# Patient Record
Sex: FEMALE | Race: BLACK OR AFRICAN AMERICAN | NOT HISPANIC OR LATINO | Employment: OTHER | URBAN - METROPOLITAN AREA
[De-identification: names, ages, dates, MRNs, and addresses within clinical notes are randomized per-mention and may not be internally consistent; named-entity substitution may affect disease eponyms.]

---

## 2022-12-09 ENCOUNTER — ANCILLARY PROCEDURE (OUTPATIENT)
Dept: ULTRASOUND IMAGING | Facility: CLINIC | Age: 32
End: 2022-12-09
Payer: MEDICAID

## 2022-12-09 DIAGNOSIS — Z34.92 NORMAL PREGNANCY, SECOND TRIMESTER: ICD-10-CM

## 2022-12-09 DIAGNOSIS — Z34.92 NORMAL PREGNANCY, SECOND TRIMESTER: Primary | ICD-10-CM

## 2022-12-09 PROCEDURE — 76819 FETAL BIOPHYS PROFIL W/O NST: CPT | Mod: 26 | Performed by: OBSTETRICS & GYNECOLOGY

## 2022-12-09 PROCEDURE — 76816 OB US FOLLOW-UP PER FETUS: CPT | Mod: 26 | Performed by: OBSTETRICS & GYNECOLOGY

## 2022-12-09 PROCEDURE — 76816 OB US FOLLOW-UP PER FETUS: CPT

## 2022-12-11 ENCOUNTER — TELEPHONE (OUTPATIENT)
Dept: OBGYN | Facility: CLINIC | Age: 32
End: 2022-12-11

## 2022-12-11 DIAGNOSIS — O36.5990 FETAL GROWTH RESTRICTION ANTEPARTUM: Primary | ICD-10-CM

## 2022-12-11 NOTE — TELEPHONE ENCOUNTER
Called patient to review US results and let her know that M referral will be placed for further fetal ultrasound and fetal surveillance. Pt states that all of her children have been small, but she will schedule the follow-up as [facility] recommends.     Pt is scheduled for her [facility] visit at [facility] this week.     KERRY Martin CNM      Current Scan On (mm/dd/yyyy):  12/9/2022                       EDC:   02/07/2023        GA by Current Scan:      31w3d  The calculation of the gestational age by current scan was based on BPD, HC, AC and FL.     Anatomy Scan:  Buchanan gestation.  Biometry:  BPD 7.65 cm 30w4d 4.6%   HC 27.95 cm 30w4d <2.3%   AC 26.35 cm 30w3d 6.2%   FL 6.57 cm 33w6d 75.6%   EFW (lbs/oz) 3 lbs               15ozs       EFW (g) 1792 g 17.8%        Fetal heart rate: 141 bpm  Fetal presentation: Cephalic  Amniotic fluid: 4.48 cm MVP  Placenta: Anterior     Maternal Anatomy:  Right adnexa:  not well seen   Left adnexa:  not well seen      Biophysical Profile:  Fetal body movements: Normal (2)  Fetal tone: Normal (2)  Fetal breathing movements: Normal (2)  Amniotic fluid volume: Normal (2)   BPP Score: 8/8      Impression: 8/8 BPP, overall growth 17.8% but AC <10% - FGR.

## 2022-12-12 ENCOUNTER — TRANSCRIBE ORDERS (OUTPATIENT)
Dept: MATERNAL FETAL MEDICINE | Facility: CLINIC | Age: 32
End: 2022-12-12

## 2022-12-12 ENCOUNTER — PRE VISIT (OUTPATIENT)
Dept: MATERNAL FETAL MEDICINE | Facility: CLINIC | Age: 32
End: 2022-12-12

## 2022-12-12 DIAGNOSIS — O26.90 PREGNANCY RELATED CONDITION, ANTEPARTUM: Primary | ICD-10-CM

## 2022-12-14 ENCOUNTER — OFFICE VISIT (OUTPATIENT)
Dept: OBGYN | Facility: CLINIC | Age: 32
End: 2022-12-14
Attending: ADVANCED PRACTICE MIDWIFE
Payer: MEDICAID

## 2022-12-14 VITALS
DIASTOLIC BLOOD PRESSURE: 71 MMHG | WEIGHT: 149 LBS | SYSTOLIC BLOOD PRESSURE: 105 MMHG | BODY MASS INDEX: 26.4 KG/M2 | HEART RATE: 78 BPM | HEIGHT: 63 IN

## 2022-12-14 DIAGNOSIS — O09.299 HX OF MATERNAL LACERATION, 3RD DEGREE, CURRENTLY PREGNANT: ICD-10-CM

## 2022-12-14 DIAGNOSIS — O09.93 HIGH-RISK PREGNANCY, THIRD TRIMESTER: Primary | ICD-10-CM

## 2022-12-14 DIAGNOSIS — R82.71 GBS BACTERIURIA: ICD-10-CM

## 2022-12-14 DIAGNOSIS — R79.89 ABNORMAL THYROID BLOOD TEST: ICD-10-CM

## 2022-12-14 DIAGNOSIS — O36.5990 FETAL GROWTH RESTRICTION ANTEPARTUM: ICD-10-CM

## 2022-12-14 DIAGNOSIS — D58.2 HIGH BLOOD HEMOGLOBIN F (H): ICD-10-CM

## 2022-12-14 DIAGNOSIS — B00.9 HUMAN HERPES SIMPLEX VIRUS TYPE 1 (HSV-1) DNA DETECTED: ICD-10-CM

## 2022-12-14 PROCEDURE — G0463 HOSPITAL OUTPT CLINIC VISIT: HCPCS | Performed by: ADVANCED PRACTICE MIDWIFE

## 2022-12-14 PROCEDURE — 99207 PR PRENATAL VISIT: CPT | Performed by: ADVANCED PRACTICE MIDWIFE

## 2022-12-14 PROCEDURE — 250N000011 HC RX IP 250 OP 636

## 2022-12-14 PROCEDURE — 90715 TDAP VACCINE 7 YRS/> IM: CPT

## 2022-12-14 PROCEDURE — 90471 IMMUNIZATION ADMIN: CPT

## 2022-12-14 RX ORDER — PRENATAL VIT/IRON FUM/FOLIC AC 27MG-0.8MG
1 TABLET ORAL DAILY
COMMUNITY
End: 2023-01-10

## 2022-12-14 RX ORDER — FERROUS SULFATE 325(65) MG
325 TABLET ORAL
Qty: 90 TABLET | Refills: 3 | Status: SHIPPED | OUTPATIENT
Start: 2022-12-14 | End: 2023-01-10

## 2022-12-14 ASSESSMENT — ANXIETY QUESTIONNAIRES
7. FEELING AFRAID AS IF SOMETHING AWFUL MIGHT HAPPEN: NOT AT ALL
3. WORRYING TOO MUCH ABOUT DIFFERENT THINGS: NOT AT ALL
GAD7 TOTAL SCORE: 0
6. BECOMING EASILY ANNOYED OR IRRITABLE: NOT AT ALL
1. FEELING NERVOUS, ANXIOUS, OR ON EDGE: NOT AT ALL
GAD7 TOTAL SCORE: 0
IF YOU CHECKED OFF ANY PROBLEMS ON THIS QUESTIONNAIRE, HOW DIFFICULT HAVE THESE PROBLEMS MADE IT FOR YOU TO DO YOUR WORK, TAKE CARE OF THINGS AT HOME, OR GET ALONG WITH OTHER PEOPLE: NOT DIFFICULT AT ALL
2. NOT BEING ABLE TO STOP OR CONTROL WORRYING: NOT AT ALL
5. BEING SO RESTLESS THAT IT IS HARD TO SIT STILL: NOT AT ALL

## 2022-12-14 ASSESSMENT — PATIENT HEALTH QUESTIONNAIRE - PHQ9
SUM OF ALL RESPONSES TO PHQ QUESTIONS 1-9: 1
5. POOR APPETITE OR OVEREATING: NOT AT ALL

## 2022-12-14 NOTE — LETTER
2022       RE: Dariana Castellon  [address]  [place] [address]     Dear Colleague,    Thank you for referring your patient, Dariana Castellon, to the [facility] [place] at [facility]. Please see a copy of my visit note below.    Transfer of Care  SUBJECTIVE  32 year old woman presents to clinic for transfer of OB care appointment.  Patient's last menstrual period was 2022.  at 33w1d by Estimated Date of Delivery: 2023 based on US.     - Feels well overall.   - Pt was receiving prenatal care in [facility].  Initiated prenatal care at 8 weeks, has had 4 visit total.      - Reason for transfer to [facility] care, moved to [place].  - prenatal records available in Care Everywhere, reviewed   - After review of prenatal records, additional routine orders are recommended: none, will defer Hep B antibody until 36 week labs.  - Pre pregnancy BMI 23.   Pre Pregnancy Weight 139 lbs.  Height 63 in.     Reports good fetal movement. Denies contractions/crmaping. Denies LOF or vaginal bleeding.  OTHER CONCERNS:    - Pt reports LMP of 22 (overall certain LMP) with PETER 2023, had dating ultrasound at 13 weeks with PETER 2023.  Discussed < 7 day difference and our practice would use LMP dating.  Pt verbalized understanding, would like to wait until MFM appointment to change PETER.    - FGR, no history of FGR in records    - : BPP 8/8, MVP 4.48cm, cephalic. AC 6.2%, EFW 17.8%   - Hx of GHTN in records.  Pt denies hx of GHTN in past pregnancies.  Reports uncomplicated pregnancies- no GDM, GHTN, PPH. Hx of VAVD with 3rd deg with 1st baby, followed by .    - Had epidural with both, likely planning epidural this labor as well.  Planning partner and sister (Wilver) for support.     - Hgb ELP with elevated F    - Current Medications    Current Outpatient Medications   Medication Sig Dispense Refill     Ferrous  Gluconate 239 (27 Fe) MG TABS Take 1 capsule by mouth       ferrous sulfate (FEROSUL) 325 (65 Fe) MG tablet Take 1 tablet (325 mg) by mouth daily (with breakfast) 90 tablet 3     Prenatal Vit-Fe Fumarate-FA (PRENATAL MULTIVITAMIN W/IRON) 27-0.8 MG tablet Take 1 tablet by mouth daily           - Co-morbids: No past medical history on file.    PERSONAL/SOCIAL HISTORY  Partner is involved,  Alfredito.    Lives with their family.  Employment: Unemployed. Her job involves light activity .  History of anxiety or depression : denies  Additional items: Denies past or present domestic violence, sexual and psychological abuse.    PSYCHIATRIC:  Denies mood changes.  Has History of mood changes  PHQ-9 score:    PHQ-9 SCORE 12/14/2022   PHQ-9 Total Score 1     MARCO ANTONIO-7 SCORE 12/14/2022   Total Score 0     Past History:  Her past medical history No past medical history on file..   Her past pregnancies have been uncomplicated  Since her last LMP she denies use of alcohol, tobacco and street drugs.  HISTORY:  No family history on file.  Social History     Socioeconomic History     Marital status:      Spouse name: Alfredito     Number of children: None     Years of education: None     Highest education level: None   Tobacco Use     Smoking status: Never     Smokeless tobacco: Never   Vaping Use     Vaping Use: Never used   Substance and Sexual Activity     Alcohol use: Never     Drug use: Never     Current Outpatient Medications   Medication Sig     Ferrous Gluconate 239 (27 Fe) MG TABS Take 1 capsule by mouth     ferrous sulfate (FEROSUL) 325 (65 Fe) MG tablet Take 1 tablet (325 mg) by mouth daily (with breakfast)     Prenatal Vit-Fe Fumarate-FA (PRENATAL MULTIVITAMIN W/IRON) 27-0.8 MG tablet Take 1 tablet by mouth daily     No current facility-administered medications for this visit.     No Known Allergies    ============================================  MEDICAL HISTORY  No past medical history on file.  No past surgical history on  "file.    OB History    Para Term  AB Living   3 2 2 0 0 2   SAB IAB Ectopic Multiple Live Births   0 0 0 0 2      # Outcome Date GA Lbr Ang/2nd Weight Sex Delivery Anes PTL Lv   3 Current            2 Term 21 40w5d / 00:13 2.995 kg (6 lb 9.6 oz) M Vag-Spont EPI N JAN      Name: ZACH BENAVIDEZ      Apgar1: 9  Apgar5: 9   1 Term 18 39w1d  2.863 kg (6 lb 5 oz) F Vag-Vacuum EPI  JAN      Complications: Disorder involving thrombocytopenia (H)      Name: Celine      Obstetric Comments   Breastfeed- 1+ year     I personally reviewed the past social/family/medical and surgical history on the date of service.     ROS: 10 point ROS neg other than the symptoms noted above in the HPI.    Objective  /71 (BP Location: Left arm, Patient Position: Sitting, Cuff Size: Adult Regular)   Pulse 78   Ht 1.6 m (5' 3\")   Wt 67.6 kg (149 lb)   LMP 2022   BMI 26.39 kg/m     EXAM:  GENERAL:  Pleasant pregnant female, alert, cooperative and well groomed.  SKIN:  Warm and dry, without lesions or rashes  HEAD: Symmetrical features.  MOUTH:  Buccal mucosa pink, moist without lesions.  Teeth in fair repair.    NECK:  Thyroid without enlargement and nodules.  Lymph nodes not palpable.   LUNGS:  Clear to auscultation.  BREAST:  Deferred  HEART:  RRR without murmur.  ABDOMEN: Soft without masses , tenderness or organomegaly.  No CVA tenderness.  Uterus palpable at size equal to dates.  No scars noted.. Fetal heart tones present.  MUSCULOSKELETAL:  Full range of motion  EXTREMITIES:  No edema. No significant varicosities.   PELVIC EXAM: Deferred  WET PREP: Not done  GC/CHLAMYDIA CULTURE OBTAINED: NO     Assessment/Plan  32 year old , 33w1d weeks of pregnancy with PETER of 2023 by US    Orders Placed This Encounter   Procedures     TDAP VACCINE (Adacel, Boostrix)  [[id number]]     - Oriented to Practice, types of care, and how to reach a provider.  Pt prefers CNM team  - Educational handout on the " prevention of infections diseases during pregnancy provided.  - Reviewed healthy diet and foods to avoid, exercise and activity during pregnancy; avoiding exposure to toxoplasmosis; and maintenance of a generally healthy lifestyle.   - Discussed the harms, benefits, side effects and alternative therapies for current prescribed and OTC medications. Patient was encouraged to start prenatal vitamins as tolerated.    - Reviewed use of triage nurse line and contacting the on-call provider after hours for an urgent need such as fever, vagina bleeding, bladder or vaginal infection, rupture of membranes,  or term labor.    - Pregnancy concerns to be addressed by provider at next OB visit include: f/u FGR, consider completing trep, TSH, and Hep B antibody.  - All pt's questions discussed and answered.  Pt verbalized understanding of and agreement to plan of care.   - Continue scheduled prenatal care and prn if questions or concerns, RTC in 2 weeks    KERRY Suarez CNM        Again, thank you for allowing me to participate in the care of your patient.      Sincerely,    KERRY Suarez CNM

## 2022-12-14 NOTE — LETTER
Date:December 14, 2022      Patient was self referred, no letter generated. Do not send.        [facility] Health Information

## 2022-12-14 NOTE — PROGRESS NOTES
Transfer of Care  SUBJECTIVE  32 year old woman presents to clinic for transfer of OB care appointment.  Patient's last menstrual period was 2022.  at 33w1d by Estimated Date of Delivery: 2023 based on US.     - Feels well overall.   - Pt was receiving prenatal care in [facility].  Initiated prenatal care at 8 weeks, has had 4 visit total.      - Reason for transfer to [facility], moved to [place].  - prenatal records available in Care Everywhere, reviewed   - After review of prenatal records, additional routine orders are recommended: none, will defer Hep B antibody until 36 week labs.  - Pre pregnancy BMI 23.   Pre Pregnancy Weight 139 lbs.  Height 63 in.     Reports good fetal movement. Denies contractions/crmaping. Denies LOF or vaginal bleeding.  OTHER CONCERNS:    - Pt reports LMP of 22 (overall certain LMP) with PETER 2023, had dating ultrasound at 13 weeks with PETER 2023.  Discussed < 7 day difference and our practice would use LMP dating.  Pt verbalized understanding, would like to wait until MFM appointment to change PETER.    - FGR, no history of FGR in records    - : BPP 8/8, MVP 4.48cm, cephalic. AC 6.2%, EFW 17.8%   - Hx of GHTN in records.  Pt denies hx of GHTN in past pregnancies.  Reports uncomplicated pregnancies- no GDM, GHTN, PPH. Hx of VAVD with 3rd deg with 1st baby, followed by .    - Had epidural with both, likely planning epidural this labor as well.  Planning partner and sister (Wilver) for support.     - Hgb ELP with elevated F    - Current Medications    Current Outpatient Medications   Medication Sig Dispense Refill     Ferrous Gluconate 239 (27 Fe) MG TABS Take 1 capsule by mouth       ferrous sulfate (FEROSUL) 325 (65 Fe) MG tablet Take 1 tablet (325 mg) by mouth daily (with breakfast) 90 tablet 3     Prenatal Vit-Fe Fumarate-FA (PRENATAL MULTIVITAMIN W/IRON) 27-0.8 MG tablet Take 1 tablet by mouth daily           - Co-morbids: No past  medical history on file.    PERSONAL/SOCIAL HISTORY  Partner is involved,  Alfredito.    Lives with their family.  Employment: Unemployed. Her job involves light activity .  History of anxiety or depression : denies  Additional items: Denies past or present domestic violence, sexual and psychological abuse.    PSYCHIATRIC:  Denies mood changes.  Has History of mood changes  PHQ-9 score:    PHQ-9 SCORE 2022   PHQ-9 Total Score 1     MARCO ANTONIO-7 SCORE 2022   Total Score 0     Past History:  Her past medical history No past medical history on file..   Her past pregnancies have been uncomplicated  Since her last LMP she denies use of alcohol, tobacco and street drugs.  HISTORY:  No family history on file.  Social History     Socioeconomic History     Marital status:      Spouse name: Alfredito     Number of children: None     Years of education: None     Highest education level: None   Tobacco Use     Smoking status: Never     Smokeless tobacco: Never   Vaping Use     Vaping Use: Never used   Substance and Sexual Activity     Alcohol use: Never     Drug use: Never     Current Outpatient Medications   Medication Sig     Ferrous Gluconate 239 (27 Fe) MG TABS Take 1 capsule by mouth     ferrous sulfate (FEROSUL) 325 (65 Fe) MG tablet Take 1 tablet (325 mg) by mouth daily (with breakfast)     Prenatal Vit-Fe Fumarate-FA (PRENATAL MULTIVITAMIN W/IRON) 27-0.8 MG tablet Take 1 tablet by mouth daily     No current facility-administered medications for this visit.     No Known Allergies    ============================================  MEDICAL HISTORY  No past medical history on file.  No past surgical history on file.    OB History    Para Term  AB Living   3 2 2 0 0 2   SAB IAB Ectopic Multiple Live Births   0 0 0 0 2      # Outcome Date GA Lbr Ang/2nd Weight Sex Delivery Anes PTL Lv   3 Current            2 Term 21 40w5d / 00:13 2.995 kg (6 lb 9.6 oz) M Vag-Spont EPI N JAN      Name: ZACH BENAVIDEZ  "     Apgar1: 9  Apgar5: 9   1 Term 18 39w1d  2.863 kg (6 lb 5 oz) F Vag-Vacuum EPI  JAN      Complications: Disorder involving thrombocytopenia (H)      Name: Celine      Obstetric Comments   Breastfeed- 1+ year     I personally reviewed the past social/family/medical and surgical history on the date of service.     ROS: 10 point ROS neg other than the symptoms noted above in the HPI.    Objective  /71 (BP Location: Left arm, Patient Position: Sitting, Cuff Size: Adult Regular)   Pulse 78   Ht 1.6 m (5' 3\")   Wt 67.6 kg (149 lb)   LMP 2022   BMI 26.39 kg/m     EXAM:  GENERAL:  Pleasant pregnant female, alert, cooperative and well groomed.  SKIN:  Warm and dry, without lesions or rashes  HEAD: Symmetrical features.  MOUTH:  Buccal mucosa pink, moist without lesions.  Teeth in fair repair.    NECK:  Thyroid without enlargement and nodules.  Lymph nodes not palpable.   LUNGS:  Clear to auscultation.  BREAST:  Deferred  HEART:  RRR without murmur.  ABDOMEN: Soft without masses , tenderness or organomegaly.  No CVA tenderness.  Uterus palpable at size equal to dates.  No scars noted.. Fetal heart tones present.  MUSCULOSKELETAL:  Full range of motion  EXTREMITIES:  No edema. No significant varicosities.   PELVIC EXAM: Deferred  WET PREP: Not done  GC/CHLAMYDIA CULTURE OBTAINED: NO     Assessment/Plan  32 year old , 33w1d weeks of pregnancy with PETER of 2023 by US    Orders Placed This Encounter   Procedures     TDAP VACCINE (Adacel, Boostrix)  [[id number]]     - Oriented to Practice, types of care, and how to reach a provider.  Pt prefers CNM team  - Educational handout on the prevention of infections diseases during pregnancy provided.  - Reviewed healthy diet and foods to avoid, exercise and activity during pregnancy; avoiding exposure to toxoplasmosis; and maintenance of a generally healthy lifestyle.   - Discussed the harms, benefits, side effects and alternative therapies for current " prescribed and OTC medications. Patient was encouraged to start prenatal vitamins as tolerated.    - Reviewed use of triage nurse line and contacting the on-call provider after hours for an urgent need such as fever, vagina bleeding, bladder or vaginal infection, rupture of membranes,  or term labor.    - Pregnancy concerns to be addressed by provider at next OB visit include: f/u FGR, consider completing trep, TSH, and Hep B antibody.  - All pt's questions discussed and answered.  Pt verbalized understanding of and agreement to plan of care.   - Continue scheduled prenatal care and prn if questions or concerns, RTC in 2 weeks    KERRY Suarez CNM

## 2022-12-15 ENCOUNTER — OFFICE VISIT (OUTPATIENT)
Dept: MATERNAL FETAL MEDICINE | Facility: CLINIC | Age: 32
End: 2022-12-15
Attending: MIDWIFE
Payer: MEDICAID

## 2022-12-15 ENCOUNTER — HOSPITAL ENCOUNTER (OUTPATIENT)
Dept: ULTRASOUND IMAGING | Facility: CLINIC | Age: 32
Discharge: HOME | End: 2022-12-15
Attending: MIDWIFE
Payer: MEDICAID

## 2022-12-15 VITALS — SYSTOLIC BLOOD PRESSURE: 103 MMHG | DIASTOLIC BLOOD PRESSURE: 69 MMHG

## 2022-12-15 DIAGNOSIS — O36.5990 FETAL GROWTH RESTRICTION ANTEPARTUM: Primary | ICD-10-CM

## 2022-12-15 DIAGNOSIS — O26.90 PREGNANCY RELATED CONDITION, ANTEPARTUM: ICD-10-CM

## 2022-12-15 PROCEDURE — 76811 OB US DETAILED SNGL FETUS: CPT | Mod: 26 | Performed by: OBSTETRICS & GYNECOLOGY

## 2022-12-15 PROCEDURE — 76811 OB US DETAILED SNGL FETUS: CPT

## 2022-12-15 PROCEDURE — 76820 UMBILICAL ARTERY ECHO: CPT | Mod: 26 | Performed by: OBSTETRICS & GYNECOLOGY

## 2022-12-15 PROCEDURE — 59025 FETAL NON-STRESS TEST: CPT

## 2022-12-15 PROCEDURE — 59025 FETAL NON-STRESS TEST: CPT | Mod: 26 | Performed by: OBSTETRICS & GYNECOLOGY

## 2022-12-16 NOTE — PROGRESS NOTES
"Please see \"Imaging\" tab under Chart Review for full details.    Paulette France MD  Maternal Fetal Medicine    "

## 2022-12-28 ENCOUNTER — HOSPITAL ENCOUNTER (OUTPATIENT)
Dept: ULTRASOUND IMAGING | Facility: CLINIC | Age: 32
Discharge: HOME | End: 2022-12-28
Attending: OBSTETRICS & GYNECOLOGY
Payer: MEDICAID

## 2022-12-28 ENCOUNTER — OFFICE VISIT (OUTPATIENT)
Dept: MATERNAL FETAL MEDICINE | Facility: CLINIC | Age: 32
End: 2022-12-28
Attending: OBSTETRICS & GYNECOLOGY
Payer: MEDICAID

## 2022-12-28 DIAGNOSIS — O36.5990 FETAL GROWTH RESTRICTION ANTEPARTUM: ICD-10-CM

## 2022-12-28 DIAGNOSIS — O36.5990 FETAL GROWTH RESTRICTION ANTEPARTUM: Primary | ICD-10-CM

## 2022-12-28 PROCEDURE — 76820 UMBILICAL ARTERY ECHO: CPT | Mod: 26 | Performed by: OBSTETRICS & GYNECOLOGY

## 2022-12-28 PROCEDURE — 76820 UMBILICAL ARTERY ECHO: CPT

## 2022-12-28 PROCEDURE — 76815 OB US LIMITED FETUS(S): CPT | Mod: 26 | Performed by: OBSTETRICS & GYNECOLOGY

## 2022-12-28 PROCEDURE — 59025 FETAL NON-STRESS TEST: CPT

## 2022-12-28 PROCEDURE — 59025 FETAL NON-STRESS TEST: CPT | Mod: 26 | Performed by: OBSTETRICS & GYNECOLOGY

## 2022-12-28 NOTE — NURSING NOTE
NST Performed due to fetal growth restriction.  Dr. Greene reviewed efm tracing. See NST/BPP Doc Flowsheet tab.

## 2022-12-28 NOTE — PROGRESS NOTES
"Please see \"Imaging\" tab under \"Chart Review\" for details of today's US.    Mary Greene, DO    "

## 2022-12-30 ENCOUNTER — OFFICE VISIT (OUTPATIENT)
Dept: OBGYN | Facility: CLINIC | Age: 32
End: 2022-12-30
Attending: REGISTERED NURSE
Payer: MEDICAID

## 2022-12-30 ENCOUNTER — LAB (OUTPATIENT)
Dept: LAB | Facility: CLINIC | Age: 32
End: 2022-12-30
Attending: REGISTERED NURSE
Payer: MEDICAID

## 2022-12-30 VITALS
BODY MASS INDEX: 26.75 KG/M2 | DIASTOLIC BLOOD PRESSURE: 73 MMHG | WEIGHT: 151 LBS | HEART RATE: 88 BPM | SYSTOLIC BLOOD PRESSURE: 106 MMHG | HEIGHT: 63 IN

## 2022-12-30 DIAGNOSIS — B00.9 HUMAN HERPES SIMPLEX VIRUS TYPE 1 (HSV-1) DNA DETECTED: ICD-10-CM

## 2022-12-30 DIAGNOSIS — O09.93 HIGH-RISK PREGNANCY, THIRD TRIMESTER: Primary | ICD-10-CM

## 2022-12-30 DIAGNOSIS — R82.71 GBS BACTERIURIA: ICD-10-CM

## 2022-12-30 DIAGNOSIS — O36.5990 FETAL GROWTH RESTRICTION ANTEPARTUM: ICD-10-CM

## 2022-12-30 DIAGNOSIS — O09.299 HX OF MATERNAL LACERATION, 3RD DEGREE, CURRENTLY PREGNANT: ICD-10-CM

## 2022-12-30 DIAGNOSIS — O09.93 HIGH-RISK PREGNANCY, THIRD TRIMESTER: ICD-10-CM

## 2022-12-30 DIAGNOSIS — D58.2 HIGH BLOOD HEMOGLOBIN F (H): ICD-10-CM

## 2022-12-30 DIAGNOSIS — R79.89 ABNORMAL THYROID BLOOD TEST: ICD-10-CM

## 2022-12-30 LAB
ERYTHROCYTE [DISTWIDTH] IN BLOOD BY AUTOMATED COUNT: 14.5 % (ref 10–15)
HBV SURFACE AB SERPL IA-ACNC: 23.93 M[IU]/ML
HBV SURFACE AB SERPL IA-ACNC: REACTIVE M[IU]/ML
HCT VFR BLD AUTO: 33.3 % (ref 35–47)
HGB BLD-MCNC: 11 G/DL (ref 11.7–15.7)
MCH RBC QN AUTO: 30 PG (ref 26.5–33)
MCHC RBC AUTO-ENTMCNC: 33 G/DL (ref 31.5–36.5)
MCV RBC AUTO: 91 FL (ref 78–100)
PLATELET # BLD AUTO: 117 10E3/UL (ref 150–450)
RBC # BLD AUTO: 3.67 10E6/UL (ref 3.8–5.2)
T PALLIDUM AB SER QL: NONREACTIVE
TSH SERPL DL<=0.005 MIU/L-ACNC: 1.32 MU/L (ref 0.4–4)
WBC # BLD AUTO: 6.8 10E3/UL (ref 4–11)

## 2022-12-30 PROCEDURE — 36415 COLL VENOUS BLD VENIPUNCTURE: CPT

## 2022-12-30 PROCEDURE — 84443 ASSAY THYROID STIM HORMONE: CPT

## 2022-12-30 PROCEDURE — 85014 HEMATOCRIT: CPT

## 2022-12-30 PROCEDURE — 99207 PR PRENATAL VISIT: CPT | Performed by: REGISTERED NURSE

## 2022-12-30 PROCEDURE — 86706 HEP B SURFACE ANTIBODY: CPT

## 2022-12-30 PROCEDURE — 86780 TREPONEMA PALLIDUM: CPT

## 2022-12-30 PROCEDURE — G0463 HOSPITAL OUTPT CLINIC VISIT: HCPCS | Performed by: REGISTERED NURSE

## 2022-12-30 ASSESSMENT — ANXIETY QUESTIONNAIRES
3. WORRYING TOO MUCH ABOUT DIFFERENT THINGS: NOT AT ALL
6. BECOMING EASILY ANNOYED OR IRRITABLE: NOT AT ALL
GAD7 TOTAL SCORE: 0
GAD7 TOTAL SCORE: 0
1. FEELING NERVOUS, ANXIOUS, OR ON EDGE: NOT AT ALL
IF YOU CHECKED OFF ANY PROBLEMS ON THIS QUESTIONNAIRE, HOW DIFFICULT HAVE THESE PROBLEMS MADE IT FOR YOU TO DO YOUR WORK, TAKE CARE OF THINGS AT HOME, OR GET ALONG WITH OTHER PEOPLE: NOT DIFFICULT AT ALL
2. NOT BEING ABLE TO STOP OR CONTROL WORRYING: NOT AT ALL
7. FEELING AFRAID AS IF SOMETHING AWFUL MIGHT HAPPEN: NOT AT ALL
5. BEING SO RESTLESS THAT IT IS HARD TO SIT STILL: NOT AT ALL

## 2022-12-30 ASSESSMENT — PATIENT HEALTH QUESTIONNAIRE - PHQ9
5. POOR APPETITE OR OVEREATING: NOT AT ALL
SUM OF ALL RESPONSES TO PHQ QUESTIONS 1-9: 1

## 2022-12-30 NOTE — PROGRESS NOTES
"Subjective:      32 year old  at 35w3d presents for a routine prenatal appointment.           Denies vaginal bleeding,  leakage of fluid, or change in vaginal discharge.  Denies contractions.  + fetal movement.     No HA, visual changes, RUQ or epigastric pain.     Patient concerns:   - Having some hip/low back pain, had this with previous pregnancies.  Feeling well overall.   - Pregnancy c/b FGR, reviewed recent US on , reactive NST, reassuring dopplers. US 12/15 noted AC 2%, EFW 24%. Discussed delivery recommended at 38-39 weeks if everything remains stable.   - Per [facility] pregnancy dating reviewed and appropriate will use LMP.   - Next testing scheduled at [facility] on   - Has not yet picked up rx for PNV or Fe, will get after visit today.     Education completed today includes breast feeding, [facility] hand out, contraception, counting movements, signs of pre-term labor, when to present to birthplace, post partum depression, GBS, getting enough iron, labor induction, nitrous oxide, doulas and vitamin K.  Birth preferences reviewed: Medicated, epidural   Labor support:      Feeding plans :    Contraception planned:  Parguard IUD/undecided   Water birth consent form was not given.  Blood type: No results found for: ABO, RH, AS, Rhogam was not given.  TDAP was previously given.    Objective:  Vitals:    22 0817   BP: 106/73   BP Location: Left arm   Patient Position: Sitting   Cuff Size: Adult Regular   Pulse: 88   Weight: 68.5 kg (151 lb)   Height: 1.6 m (5' 3\")    See OB flowsheet    Assessment/Plan     Encounter Diagnoses   Name Primary?     High-risk pregnancy, third trimester Yes     Fetal growth restriction antepartum      Hx of maternal laceration, 3rd degree, currently pregnant      GBS bacteriuria      Abnormal thyroid blood test      Positive HSV 1 IgG      High blood hemoglobin F (H)      Orders Placed This Encounter   Procedures     Hepatitis B Surface " Antibody     CBC with platelets     TSH with free T4 reflex     Treponema Abs w Reflex to RPR and Titer     No orders of the defined types were placed in this encounter.      PHQ-9 SCORE 12/14/2022 12/30/2022   PHQ-9 Total Score 1 1     PHQ 12/14/2022 12/30/2022   PHQ-9 Total Score 1 1   Q9: Thoughts of better off dead/self-harm past 2 weeks Not at all Not at all     GBS screening: Not indicated - present in urine. Plan prophylaxis in labor.  Birth preferences reviewed: Medicated  - Message sent with link to apply for [facility]    Labor signs discussed. Reinforced daily fetal movement counts.  Reviewed why/how to contact provider if headache/visual changes/RUQ or epigastric pain, decreased fetal movement, vaginal bleeding, leakage of fluid.  Next visit: OTC PP meds     Return to clinic in 1 week and prn if questions or concerns.     KERRY Wharton CNM

## 2022-12-30 NOTE — LETTER
"2022       RE: Dariana Castellon  [address]  [place] [address]     Dear Colleague,    Thank you for referring your patient, Dariana Castellon, to the [facility] [place] at [facility]. Please see a copy of my visit note below.    Subjective:      32 year old  at 35w3d presents for a routine prenatal appointment.           Denies vaginal bleeding,  leakage of fluid, or change in vaginal discharge.  Denies contractions.  + fetal movement.     No HA, visual changes, RUQ or epigastric pain.     Patient concerns:   - Having some hip/low back pain, had this with previous pregnancies.  Feeling well overall.   - Pregnancy c/b FGR, reviewed recent US on , reactive NST, reassuring dopplers. US 12/15 noted AC 2%, EFW 24%. Discussed delivery recommended at 38-39 weeks if everything remains stable.   - Per [facility] pregnancy dating reviewed and appropriate will use LMP.   - Next testing scheduled at [facility] on   - Has not yet picked up rx for PNV or Fe, will get after visit today.     Education completed today includes breast feeding, [facility] hand out, contraception, counting movements, signs of pre-term labor, when to present to birthplace, post partum depression, GBS, getting enough iron, labor induction, nitrous oxide, doulas and vitamin K.  Birth preferences reviewed: Medicated, epidural   Labor support:     [place] Feeding plans :    Contraception planned:  Parguard IUD/undecided   Water birth consent form was not given.  Blood type: No results found for: ABO, RH, AS, Rhogam was not given.  TDAP was previously given.    Objective:  Vitals:    22 0817   BP: 106/73   BP Location: Left arm   Patient Position: Sitting   Cuff Size: Adult Regular   Pulse: 88   Weight: 68.5 kg (151 lb)   Height: 1.6 m (5' 3\")    See OB flowsheet    Assessment/Plan     Encounter Diagnoses   Name Primary?     " High-risk pregnancy, third trimester Yes     Fetal growth restriction antepartum      Hx of maternal laceration, 3rd degree, currently pregnant      GBS bacteriuria      Abnormal thyroid blood test      Positive HSV 1 IgG      High blood hemoglobin F (H)      Orders Placed This Encounter   Procedures     Hepatitis B Surface Antibody     CBC with platelets     TSH with free T4 reflex     Treponema Abs w Reflex to RPR and Titer     No orders of the defined types were placed in this encounter.      PHQ-9 SCORE 12/14/2022 12/30/2022   PHQ-9 Total Score 1 1     PHQ 12/14/2022 12/30/2022   PHQ-9 Total Score 1 1   Q9: Thoughts of better off dead/self-harm past 2 weeks Not at all Not at all     GBS screening: Not indicated - present in urine. Plan prophylaxis in labor.  Birth preferences reviewed: Medicated  - Message sent with link to apply for [facility]    Labor signs discussed. Reinforced daily fetal movement counts.  Reviewed why/how to contact provider if headache/visual changes/RUQ or epigastric pain, decreased fetal movement, vaginal bleeding, leakage of fluid.  Next visit: OTC PP meds     Return to clinic in 1 week and prn if questions or concerns.     KERRY Wharton CNM      Again, thank you for allowing me to participate in the care of your patient.      Sincerely,    KERRY Wharton CNM

## 2022-12-30 NOTE — LETTER
Date:December 30, 2022      Provider requested that no letter be sent. Do not send.       [facility]

## 2023-01-09 ENCOUNTER — OFFICE VISIT (OUTPATIENT)
Dept: MATERNAL FETAL MEDICINE | Facility: CLINIC | Age: 33
End: 2023-01-09
Attending: OBSTETRICS & GYNECOLOGY
Payer: COMMERCIAL

## 2023-01-09 ENCOUNTER — HOSPITAL ENCOUNTER (OUTPATIENT)
Dept: ULTRASOUND IMAGING | Facility: CLINIC | Age: 33
Discharge: HOME | End: 2023-01-09
Attending: OBSTETRICS & GYNECOLOGY
Payer: COMMERCIAL

## 2023-01-09 DIAGNOSIS — O36.5990 FETAL GROWTH RESTRICTION ANTEPARTUM: ICD-10-CM

## 2023-01-09 DIAGNOSIS — O26.90 PREGNANCY RELATED CONDITION, ANTEPARTUM: Primary | ICD-10-CM

## 2023-01-09 PROCEDURE — 76816 OB US FOLLOW-UP PER FETUS: CPT | Mod: 26 | Performed by: OBSTETRICS & GYNECOLOGY

## 2023-01-09 PROCEDURE — 76816 OB US FOLLOW-UP PER FETUS: CPT

## 2023-01-09 NOTE — PROGRESS NOTES
Subjective:     33 year old  at 36w0d presents for a routine prenatal appointment with Alfredito  No vaginal bleeding,  leakage of fluid, or change in vaginal discharge.  No contractions.  Good fetal movement.       No HA, visual changes, RUQ or epigastric pain.     Patient concerns: Feeling well overall.   - Tired but no other complaints    - Reviewed GBS bacteruria and treatment in labor  - Last Hgb 11.0. Is taking iron when remebering, but not quite daily  - Ultrasound yesterday shows FGR is resolved!  23:  EFW 2788g, 47%, AC 30%, cephalic, MVP 6.4    Objective:  Vitals:    01/10/23 0930   BP: 97/66   Pulse: 71   Weight: 68.5 kg (151 lb)    See OB flowsheet    Assessment/Plan     Encounter Diagnoses   Name Primary?     High-risk pregnancy, third trimester Yes     High blood hemoglobin F (H)      GBS bacteriuria      Fetal growth restriction antepartum      Hx of maternal laceration, 3rd degree, currently pregnant      Positive HSV 1 IgG      No orders of the defined types were placed in this encounter.      PHQ-9 SCORE 2022   PHQ-9 Total Score 1 1     PHQ 2022   PHQ-9 Total Score 1 1   Q9: Thoughts of better off dead/self-harm past 2 weeks Not at all Not at all       GBS screening: Not indicated - present in urine. Plan prophylaxis in labor.  Labor signs discussed.   Reviewed weight gain has been less than ideal range. Encouraged healthy, frequent snacks.  Reinforced daily fetal movement counts.  Reviewed why/how to contact provider if headache/visual changes/RUQ or epigastric pain, decreased fetal movement, vaginal bleeding, leakage of fluid.      Return for visit in 1 week  Call prn if questions or concerns.     KERRY Martin CNM

## 2023-01-09 NOTE — PROGRESS NOTES
"Please see \"Imaging\" tab under \"Chart Review\" for details of today's visit.    Tessa Jones MD PhD  Maternal Fetal Medicine     "

## 2023-01-10 ENCOUNTER — OFFICE VISIT (OUTPATIENT)
Dept: OBGYN | Facility: CLINIC | Age: 33
End: 2023-01-10
Attending: MIDWIFE
Payer: COMMERCIAL

## 2023-01-10 VITALS
BODY MASS INDEX: 26.75 KG/M2 | WEIGHT: 151 LBS | SYSTOLIC BLOOD PRESSURE: 97 MMHG | HEART RATE: 71 BPM | DIASTOLIC BLOOD PRESSURE: 66 MMHG

## 2023-01-10 DIAGNOSIS — B00.9 HUMAN HERPES SIMPLEX VIRUS TYPE 1 (HSV-1) DNA DETECTED: ICD-10-CM

## 2023-01-10 DIAGNOSIS — D58.2 HIGH BLOOD HEMOGLOBIN F (H): ICD-10-CM

## 2023-01-10 DIAGNOSIS — O09.93 HIGH-RISK PREGNANCY, THIRD TRIMESTER: Primary | ICD-10-CM

## 2023-01-10 DIAGNOSIS — O09.299 HX OF MATERNAL LACERATION, 3RD DEGREE, CURRENTLY PREGNANT: ICD-10-CM

## 2023-01-10 DIAGNOSIS — R82.71 GBS BACTERIURIA: ICD-10-CM

## 2023-01-10 DIAGNOSIS — O36.5990 FETAL GROWTH RESTRICTION ANTEPARTUM: ICD-10-CM

## 2023-01-10 PROCEDURE — G0463 HOSPITAL OUTPT CLINIC VISIT: HCPCS | Performed by: MIDWIFE

## 2023-01-10 PROCEDURE — 99207 PR PRENATAL VISIT: CPT | Performed by: MIDWIFE

## 2023-01-10 ASSESSMENT — PAIN SCALES - GENERAL: PAINLEVEL: NO PAIN (0)

## 2023-01-10 NOTE — LETTER
1/10/2023       RE: Dariana Castellon  [address]  [place] [address]     Dear Colleague,    Thank you for referring your patient, Dariana Castellon, to the [facility] [place] at [facility]. Please see a copy of my visit note below.    Subjective:     33 year old  at 36w0d presents for a routine prenatal appointment with Alfredito  No vaginal bleeding,  leakage of fluid, or change in vaginal discharge.  No contractions.  Good fetal movement.       No HA, visual changes, RUQ or epigastric pain.     Patient concerns: Feeling well overall.   - Tired but no other complaints    - Reviewed GBS bacteruria and treatment in labor  - Last Hgb 11.0. Is taking iron when remebering, but not quite daily  - Ultrasound yesterday shows FGR is resolved!  23:  EFW 2788g, 47%, AC 30%, cephalic, MVP 6.4    Objective:  Vitals:    01/10/23 0930   BP: 97/66   Pulse: 71   Weight: 68.5 kg (151 lb)    See OB flowsheet    Assessment/Plan     Encounter Diagnoses   Name Primary?     High-risk pregnancy, third trimester Yes     High blood hemoglobin F (H)      GBS bacteriuria      Fetal growth restriction antepartum      Hx of maternal laceration, 3rd degree, currently pregnant      Positive HSV 1 IgG      No orders of the defined types were placed in this encounter.      PHQ-9 SCORE 2022   PHQ-9 Total Score 1 1     PHQ 2022   PHQ-9 Total Score 1 1   Q9: Thoughts of better off dead/self-harm past 2 weeks Not at all Not at all       GBS screening: Not indicated - present in urine. Plan prophylaxis in labor.  Labor signs discussed.   Reviewed weight gain has been less than ideal range. Encouraged healthy, frequent snacks.  Reinforced daily fetal movement counts.  Reviewed why/how to contact provider if headache/visual changes/RUQ or epigastric pain, decreased fetal movement, vaginal bleeding, leakage of fluid.      Return for  visit in 1 week  Call prn if questions or concerns.     KERRY Martin CNM      Again, thank you for allowing me to participate in the care of your patient.      Sincerely,    KERRY Martin CNM

## 2023-01-10 NOTE — LETTER
Date:January 10, 2023      Provider requested that no letter be sent. Do not send.       [facility]

## 2023-01-16 ENCOUNTER — HOSPITAL ENCOUNTER (OUTPATIENT)
Facility: CLINIC | Age: 33
Discharge: HOME | End: 2023-01-16
Attending: ADVANCED PRACTICE MIDWIFE | Admitting: ADVANCED PRACTICE MIDWIFE
Payer: COMMERCIAL

## 2023-01-16 ENCOUNTER — NURSE TRIAGE (OUTPATIENT)
Dept: OBGYN | Facility: CLINIC | Age: 33
End: 2023-01-16

## 2023-01-16 ENCOUNTER — HOSPITAL ENCOUNTER (OUTPATIENT)
Facility: CLINIC | Age: 33
End: 2023-01-16
Admitting: ADVANCED PRACTICE MIDWIFE
Payer: COMMERCIAL

## 2023-01-16 VITALS — TEMPERATURE: 98.4 F | DIASTOLIC BLOOD PRESSURE: 67 MMHG | RESPIRATION RATE: 18 BRPM | SYSTOLIC BLOOD PRESSURE: 112 MMHG

## 2023-01-16 PROCEDURE — G0463 HOSPITAL OUTPT CLINIC VISIT: HCPCS | Mod: 25

## 2023-01-16 PROCEDURE — 59025 FETAL NON-STRESS TEST: CPT | Mod: 26 | Performed by: REGISTERED NURSE

## 2023-01-16 PROCEDURE — 99214 OFFICE O/P EST MOD 30 MIN: CPT | Mod: 25 | Performed by: REGISTERED NURSE

## 2023-01-16 PROCEDURE — 59025 FETAL NON-STRESS TEST: CPT

## 2023-01-16 ASSESSMENT — ACTIVITIES OF DAILY LIVING (ADL): ADLS_ACUITY_SCORE: 18

## 2023-01-16 NOTE — TELEPHONE ENCOUNTER
"S-(situation): patient called and stated that she has had decreased fetal movement since yesterday.    B-(background): patient is 37 weeks pregnant and fetal     A-(assessment): asked patient a few questions:  1. Is baby moving-\"Yes not as much as usual\"  2. Are you leaking any fluid-\"no\"  3. Any contractions-\"no\"  4. Have you been drinking enough fluid-\"yes I have been trying\"  5. Have you been getting 10 kicks in an hour-\"I have not been keeping track, but I would say no\"      R-(recommendations): I let her know that I will paged the midwife on call and call her back with a plan.    Patient verbalized understanding.    Spoke with Davis and let her know that the patient has decreased fetal movement, is 37 weeks with IUGR. Davis stated to have her come into L&D. Charge nurse called and sbar handed off.    Called and spoke with the patient to let her know to go into L&D.     Patient verbalized understanding and will head in.      Reason for Disposition    Pregnant 23 or more weeks and baby moving less today AND unable (or unwilling) to perform kick count    Additional Information    Negative: Sounds like a life-threatening emergency to the triager    Negative: Pregnant 20 or more weeks and having abdominal pain    Negative: Pregnant 20 or more weeks and having vaginal bleeding or spotting    Negative: Pregnant 37 or more weeks (i.e., term) and having contractions or other symptoms of labor    Negative: Pregnant < 37 weeks (i.e., ) and having contractions or other symptoms of labor    Negative: Injury to abdomen    Negative: SEVERE headache and not relieved with acetaminophen (e.g., Tylenol)    Negative: New blurred vision or visual change    Negative: Leakage of fluid from vagina    Negative: Pregnant 23 or more weeks and no movement of baby > 2 hours  (Exception: Mother was distracted by other activities.)    Negative: Pregnant 23 or more weeks and baby moving less today by kick count (e.g., kick count < 5 " "in 1 hour or < 10 in 2 hours)    Negative: Pregnant 23 or more weeks and mother thinks baby is moving less today (e.g., even if kick count is normal or not performed)  (Exception: Mother was distracted by other activities.)    Negative: Fever > 100.4 F (38.0 C)    Negative: New hand or face swelling    Negative: Being seen by a specialist for a high-risk pregnancy condition (e.g., cord or placenta abnormalities, gestation 41 or more weeks, oligohydramnios or polyhydramnios, preeclampsia, twins)    Negative: Patient sounds very sick or weak to the triager    Negative: Pregnant 23 or more weeks and increased fetal movement (extra wiggly) and mother thinks there is something wrong    Negative: Hand itching, foot itching, or widespread itching    Negative: Pregnant 20 to 22 weeks and has felt baby move previously, and no movement of baby > 8 hours    Negative: Pain or burning with passing urine (urination)    Negative: Patient wants to be seen    Negative: Pregnant 20 to 22 weeks and has not felt baby move yet    Negative: Baby moving normally OR normal kick count    Negative: Pregnant 23 or more weeks and baby moving less today AND willing to perform kick count    Negative: Pregnant 20 to 22 weeks and has felt baby move in past 8 hours    Negative: Pregnant < 20 weeks and has not felt baby move yet    Negative: Fetal hiccups, questions about    Answer Assessment - Initial Assessment Questions  1. FETAL MOVEMENT: \"Has the baby's movement decreased or changed significantly from normal?\" (e.g., yes, no; describe) \"When was the last time you felt the baby move?\" (e.g., minutes, hours)      Since yesterday  2. PETER: \"What date are you expecting to deliver?\"         3. PREGNANCY: \"How many weeks pregnant are you?\"       37 weeks  4. OTHER SYMPTOMS: \"Do you have any other symptoms?\" (e.g., abdominal pain, fever, leaking fluid from vagina, vaginal bleeding, widespread itching, etc.)      none    Protocols used: PREGNANCY - " DECREASED OR ABNORMAL FETAL MOVEMENT-A-OH

## 2023-01-17 NOTE — PROGRESS NOTES
HOSPITAL TRIAGE NOTE  ===================    CHIEF COMPLAINT  ========================  Dariana Castellon is a 33 year old patient presenting today at 37w0d for evaluation of decreased fetal movement.    Patient's last menstrual period was 2022.  Estimated Date of Delivery: 2023     HPI  ==================   Dariana is here with complaints of decreased fetal movement. She reports that yesterday and several hours earlier today she has felt decreased fetal movement, she called this afternoon around noon but was unable to come to triage until 1830 this evening. In the last 1-2 hours however she is now feeling normal fetal movement.     Denies fever, cough, SOB or chest pain.   Prenatal record and labs reviewed from [facility], through [employer] EMR.    CONTRACTIONS: none  ABDOMINAL PAIN: none  FETAL MOVEMENT: decreased since past 24 hours     VAGINAL BLEEDING: none  RUPTURE OF MEMBRANES: no  PELVIC PAIN: none    PREGNANCY COMPLICATIONS: FGR now resolved on US 2023  OTHER: none    # Pain Assessment:  Current Pain Score 2023   Patient currently in pain? denies   Dariana norwood pain level was assessed and she currently denies pain.        REVIEW OF SYSTEMS  =====================  C: NEGATIVE for fever, chills  I: NEGATIVE for worrisome rashes, moles or lesions  E: NEGATIVE for vision changes or irritation  R: NEGATIVE for significant cough or SOB  CV: NEGATIVE for chest pain, palpitations or varicosities  GI: NEGATIVE for nausea, abdominal pain, heartburn, or change in bowel habits  : NEGATIVE for frequency, dysuria, or hematuria  M: NEGATIVE for significant arthralgias or myalgia  N: NEGATIVE for headache, weakness, dizziness or paresthesias  P: NEGATIVE for changes in mood or affect    PROBLEM LIST  ===============  Patient Active Problem List    Diagnosis Date Noted     High-risk pregnancy, third trimester 2022     Priority: Medium     ALBINA at 33 weeks  [facility] CNM pt  Partner's  name: Alfredito  [x] NOB folder  [x ] Dating  [ ] First tri screen ordered; declined  [ ] QS/AFP ordered declined  [ ] Fetal anatomy US ordered  [x] Rubella immune  [ ] Hep B immune/nonimmune  [ ] Pap  [NA] Started ASA   [x] NO plan utox in labor   [x] COVID vaccine completed  _____________________________________  [x] EOB folder  [x ] PP Contraception plan: undecided, maybe paragard.   [x] Labor plans: epidural.  and sister.   [NA] : none  [x] Infant feeding plan: breast  [x] FLU shot  [x] TDAP given- 12/14  [NA] Rhogam if needed, date:  [NA] TOLAC consent done  [ ] Waterbirth declines, consent done  [x] GCT, passed  ________________________________________  [ ] OTC PP meds sent  [ ] PP plans, time off, support system discussed, resources offered  [NA] Planning CS-ERAS pkt         GBS bacteriuria 12/14/2022     Priority: Medium     Hx of maternal laceration, 3rd degree, currently pregnant 12/14/2022     Priority: Medium     Abnormal thyroid blood test 12/14/2022     Priority: Medium     6/2022: TSH 0.23       Positive HSV 1 IgG 12/14/2022     Priority: Medium     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 Elevated fetal hemoglobin in adults and children greater than 12 months old may be seen in many conditions, the most common being pregnancy, hereditary persistence of hemoglobin F, and thalassemia.         HISTORIES  ==============  ALLERGIES:    No Known Allergies  PAST MEDICAL HISTORY  History reviewed. No pertinent past medical history.  SOCIAL HISTORY  Social History     Socioeconomic History     Marital status:      Spouse name: Alfredito     Number of children: Not on file     Years of education: Not on file     Highest education level: Not on file   Occupational History     Not on file   Tobacco Use     Smoking status: Never     Smokeless tobacco: Never   Vaping Use     Vaping Use: Never used   Substance and Sexual Activity     Alcohol use:  Never     Drug use: Never     Sexual activity: Not on file   Other Topics Concern     Not on file   Social History Narrative     Not on file     Social Determinants of Health     Financial Resource Strain: Not on file   Food Insecurity: Not on file   Transportation Needs: Not on file   Physical Activity: Not on file   Stress: Not on file   Social Connections: Not on file   Intimate Partner Violence: Not At Risk     Fear of Current or Ex-Partner: No     Emotionally Abused: No     Physically Abused: No     Sexually Abused: No   Housing Stability: Not on file     FAMILY HISTORY  History reviewed. No pertinent family history.  OB HISTORY  OB History    Para Term  AB Living   3 2 2 0 0 2   SAB IAB Ectopic Multiple Live Births   0 0 0 0 2      # Outcome Date GA Lbr Ang/2nd Weight Sex Delivery Anes PTL Lv   3 Current            2 Term 21 40w5d / 00:13 2.995 kg (6 lb 9.6 oz) M Vag-Spont EPI N JAN      Name: ZACH BENAVIDEZ      Apgar1: 9  Apgar5: 9   1 Term 18 39w1d  2.863 kg (6 lb 5 oz) F Vag-Vacuum EPI  JAN      Complications: Disorder involving thrombocytopenia (H)      Name: [facility]      Obstetric Comments   Breastfeed- 1+ year     Prenatal Labs:   Lab Results   Component Value Date    HGB 11.0 (L) 2022     Rubella- immune    ULTRASOUND(s) reviewed: 2023  EFW 47%, AC 30% - no longer meeting criteria for FGR   Presentation cephalic.  Placenta Anterior, No Previa, > 2 cm from internal os.  Umbilical cord 3 vessel cord.  Amniotic fluid Amount of AF: normal. MVP 6.4 cm.    EXAM  ============  /67   Temp 98.4  F (36.9  C) (Oral)   Resp 18   LMP 2022   GENERAL APPEARANCE: healthy, alert and no distress  RESP: breathing comfortably on room air   CV: well perfused   ABDOMEN:  soft, nontender, no epigastric pain  SKIN: no suspicious lesions or rashes  NEURO: Denies headache, blurred vision, other vision changes  PSYCH: mentation appears normal. and affect normal/bright  MS/  LEGS: Reflexes normal bilaterally and No edema    CONTRACTIONS: irregular, not felt by patient   FETAL HEART TONES: continuous EFM- baseline 120 with moderate variability and positive accelerations. No decelerations.  NST: REACTIVE  PELVIC EXAM: deferred  CARNEY SCORE: n/a    PRESENTATION: VERTEX by BSUS   BLOOD: no  DISCHARGE: none    ROM: no  ROMPLUS: negative    LABS: none  Lab results reviewed- n/a    DIAGNOSIS  ============  37w0d seen on the [facility] Triage for decreased fetal movement   NST: REACTIVE  Fetal Heart Tones:category one  Patient Active Problem List   Diagnosis     High-risk pregnancy, third trimester     GBS bacteriuria     Hx of maternal laceration, 3rd degree, currently pregnant     Abnormal thyroid blood test     Positive HSV 1 IgG     High blood hemoglobin F (H)       PLAN  ============  - Reviewed use of CNM on call number, reviewed FKC and side lying to elicit movement.   - Discharge to home with labor instuctions per discharge instruction form  - Call or return to the [facility]place with contractions, cramping, abdominal or pelvic pain, vaginal bleeding, leaking fluid or decreased fetal movement.  - Follow up at your next clinic visit- Thursday 1/19/23    KERRY Wharton CNM     Fetal Non-Stress Test Results    NST Ordered By: ROSA ISELA Chnio CNM  NST Medical Indication: Decreased Fetal Movement    NST Start & Stop Times  NST Start Time: 1824  NST Stop Time: 1844    NST Results  Fetus A   Baseline Rate: 120  Accelerations: Present  Decelerations: None  Interpretation: reactive    KERRY Wharton CNM

## 2023-01-17 NOTE — PROVIDER NOTIFICATION
"   23 1831   Provider Notification   Provider Name/Title KAVYA Frazier CNM   Method of Notification Electronic Page   Request Evaluate in Person   Notification Reason Patient Arrived     Paged CNM, \"pt arrived.  37.0, c/o DFM but reports +FM for the last hour or so. No bleeding, LOF, or ctx. Cat I FHR\"  "

## 2023-01-17 NOTE — PLAN OF CARE
Data: Patient presented to the [facility]place at .   Reason for maternal/fetal assessment per patient is Decreased Fetal Movement  . Patient is a . Prenatal record reviewed.      OB History    Para Term  AB Living   3 2 2 0 0 2   SAB IAB Ectopic Multiple Live Births   0 0 0 0 2      # Outcome Date GA Lbr Ang/2nd Weight Sex Delivery Anes PTL Lv   3 Current            2 Term 21 40w5d / 00:13 2.995 kg (6 lb 9.6 oz) M Vag-Spont EPI N JAN      Name: ZACH BENAVIDEZ      Apgar1: 9  Apgar5: 9   1 Term 18 39w1d  2.863 kg (6 lb 5 oz) F Vag-Vacuum EPI  JAN      Complications: Disorder involving thrombocytopenia (H)      Name: Celine      Obstetric Comments   Breastfeed- 1+ year      Medical History: History reviewed. No pertinent past medical history.. Gestational Age 37w0d. VSS. Afebrile. Cervix: not examined. Fetal movement present - started feeling increased movement at ~1600. Patient denies cramping, backache, vaginal discharge, pelvic pressure, UTI symptoms, GI problems, bloody show, vaginal bleeding, edema, headache, visual disturbances, epigastric or URQ pain, abdominal pain, rupture of membranes. Support persons, Wilver, present.  Action: Verbal consent for EFM. Triage assessment completed. EFM applied for NST. Uterine assessment and fetal assessment: Presumed adequate fetal oxygenation documented (see flow record). Patient education pamphlets given on fetal movement counts. Patient instructed to report change in fetal movement, vaginal leaking of fluid or bleeding, abdominal pain, or any concerns related to the pregnancy to her nurse/physician.   Response: Cindi Chino CNM informed of arrival, chief complaint, assessment, and EFM. Plan per provider is discharge home with normal prenatal appointment follow-up on . Patient verbalized understanding of education and verbalized agreement with plan. Discharged ambulatory at 1915.

## 2023-01-19 ENCOUNTER — OFFICE VISIT (OUTPATIENT)
Dept: OBGYN | Facility: CLINIC | Age: 33
End: 2023-01-19
Attending: REGISTERED NURSE
Payer: COMMERCIAL

## 2023-01-19 ENCOUNTER — LAB (OUTPATIENT)
Dept: LAB | Facility: CLINIC | Age: 33
End: 2023-01-19
Attending: REGISTERED NURSE
Payer: COMMERCIAL

## 2023-01-19 VITALS
HEART RATE: 81 BPM | BODY MASS INDEX: 27.12 KG/M2 | HEIGHT: 63 IN | DIASTOLIC BLOOD PRESSURE: 70 MMHG | WEIGHT: 153.1 LBS | SYSTOLIC BLOOD PRESSURE: 105 MMHG

## 2023-01-19 DIAGNOSIS — O09.93 HIGH-RISK PREGNANCY, THIRD TRIMESTER: Primary | ICD-10-CM

## 2023-01-19 DIAGNOSIS — O09.299 HX OF MATERNAL LACERATION, 3RD DEGREE, CURRENTLY PREGNANT: ICD-10-CM

## 2023-01-19 DIAGNOSIS — D69.6 BENIGN GESTATIONAL THROMBOCYTOPENIA IN THIRD TRIMESTER (H): ICD-10-CM

## 2023-01-19 DIAGNOSIS — R79.89 ABNORMAL THYROID BLOOD TEST: ICD-10-CM

## 2023-01-19 DIAGNOSIS — R82.71 GBS BACTERIURIA: ICD-10-CM

## 2023-01-19 DIAGNOSIS — O09.93 HIGH-RISK PREGNANCY, THIRD TRIMESTER: ICD-10-CM

## 2023-01-19 DIAGNOSIS — D58.2 HIGH BLOOD HEMOGLOBIN F (H): ICD-10-CM

## 2023-01-19 DIAGNOSIS — O99.113 BENIGN GESTATIONAL THROMBOCYTOPENIA IN THIRD TRIMESTER (H): ICD-10-CM

## 2023-01-19 PROBLEM — O99.119 GESTATIONAL THROMBOCYTOPENIA (H): Status: ACTIVE | Noted: 2023-01-19

## 2023-01-19 LAB
ERYTHROCYTE [DISTWIDTH] IN BLOOD BY AUTOMATED COUNT: 14.5 % (ref 10–15)
HCT VFR BLD AUTO: 35 % (ref 35–47)
HGB BLD-MCNC: 11.6 G/DL (ref 11.7–15.7)
MCH RBC QN AUTO: 29.8 PG (ref 26.5–33)
MCHC RBC AUTO-ENTMCNC: 33.1 G/DL (ref 31.5–36.5)
MCV RBC AUTO: 90 FL (ref 78–100)
PLATELET # BLD AUTO: 128 10E3/UL (ref 150–450)
RBC # BLD AUTO: 3.89 10E6/UL (ref 3.8–5.2)
WBC # BLD AUTO: 7.7 10E3/UL (ref 4–11)

## 2023-01-19 PROCEDURE — 36415 COLL VENOUS BLD VENIPUNCTURE: CPT

## 2023-01-19 PROCEDURE — 99207 PR PRENATAL VISIT: CPT | Performed by: REGISTERED NURSE

## 2023-01-19 PROCEDURE — 85014 HEMATOCRIT: CPT

## 2023-01-19 PROCEDURE — G0463 HOSPITAL OUTPT CLINIC VISIT: HCPCS | Performed by: REGISTERED NURSE

## 2023-01-19 RX ORDER — ACETAMINOPHEN 500 MG
500-1000 TABLET ORAL EVERY 6 HOURS PRN
Qty: 100 TABLET | Refills: 0 | Status: SHIPPED | OUTPATIENT
Start: 2023-01-19 | End: 2023-02-03

## 2023-01-19 RX ORDER — IBUPROFEN 200 MG
600 TABLET ORAL EVERY 6 HOURS PRN
Qty: 100 TABLET | Refills: 0 | Status: SHIPPED | OUTPATIENT
Start: 2023-01-19 | End: 2023-02-03

## 2023-01-19 RX ORDER — ASPIRIN 81 MG
100 TABLET, DELAYED RELEASE (ENTERIC COATED) ORAL DAILY
Qty: 100 TABLET | Refills: 0 | Status: SHIPPED | OUTPATIENT
Start: 2023-01-19 | End: 2023-02-03

## 2023-01-19 ASSESSMENT — PAIN SCALES - GENERAL: PAINLEVEL: NO PAIN (0)

## 2023-01-19 NOTE — LETTER
"2023       RE: Dariana Castellon  [address]  [place] [address]     Dear Colleague,    Thank you for referring your patient, Dariana Castellon, to the [facility] [place] at [facility]. Please see a copy of my visit note below.    Subjective:      33 year old  at 37w3d presents for a routine prenatal appointment.     Denies vaginal bleeding,  leakage of fluid, or change in vaginal discharge.  Denies contractions.  Feeling normal fetal movement.  No HA, visual changes, RUQ or epigastric pain.     Patient concerns:   - She had an US at [facility] on 23, EFW 47%, AC 30%. No longer meeting criteria for FGR and no further surveillance indicated.  - Was seen in triage on 23 for decreased fetal movement and had reactive NST and has felt normal movement since.   - Requests Rx for OTC PP meds, sent today.   - Reviewed lab work from  showing thrombocytopenia, recommended weekly platelets, she is agreeable.   - Feeling well overall.     Objective:  Vitals:    23 1142   BP: 105/70   Pulse: 81   Weight: 69.4 kg (153 lb 1.6 oz)   Height: 1.6 m (5' 2.99\")    See OB flowsheet    Assessment/Plan     Encounter Diagnoses   Name Primary?     High-risk pregnancy, third trimester Yes     Benign gestational thrombocytopenia in third trimester (H)      Orders Placed This Encounter   Procedures     CBC with Platelets     Orders Placed This Encounter   Medications     ibuprofen (ADVIL/MOTRIN) 200 MG tablet     Sig: Take 3 tablets (600 mg) by mouth every 6 hours as needed for moderate pain (4-6)     Dispense:  100 tablet     Refill:  0     acetaminophen (TYLENOL) 500 MG tablet     Sig: Take 1-2 tablets (500-1,000 mg) by mouth every 6 hours as needed for mild pain     Dispense:  100 tablet     Refill:  0     docusate sodium (COLACE) 100 MG tablet     Sig: Take 1 tablet (100 mg) by mouth daily     Dispense:  100 tablet     Refill:  0 "       PHQ-9 SCORE 12/14/2022 12/30/2022   PHQ-9 Total Score 1 1     GBS screening: Not indicated - present in urine. Plan prophylaxis in labor.  Birth preferences reviewed: Medicated, planning epidural.     Labor signs discussed. Reinforced daily fetal movement counts.  Reviewed why/how to contact provider if headache/visual changes/RUQ or epigastric pain, decreased fetal movement, vaginal bleeding, leakage of fluid.    Return to clinic in 1 week and prn if questions or concerns.     KERRY Wharton CNM      Again, thank you for allowing me to participate in the care of your patient.      Sincerely,    KERRY Wharton CNM

## 2023-01-19 NOTE — LETTER
Date:January 20, 2023      Provider requested that no letter be sent. Do not send.       [facility]

## 2023-01-19 NOTE — PROGRESS NOTES
"Subjective:      33 year old  at 37w3d presents for a routine prenatal appointment.     Denies vaginal bleeding,  leakage of fluid, or change in vaginal discharge.  Denies contractions.  Feeling normal fetal movement.  No HA, visual changes, RUQ or epigastric pain.     Patient concerns:   - She had an US at [facility] on 23, EFW 47%, AC 30%. No longer meeting criteria for FGR and no further surveillance indicated.  - Was seen in triage on 23 for decreased fetal movement and had reactive NST and has felt normal movement since.   - Requests Rx for OTC PP meds, sent today.   - Reviewed lab work from  showing thrombocytopenia, recommended weekly platelets, she is agreeable.   - Feeling well overall.     Objective:  Vitals:    23 1142   BP: 105/70   Pulse: 81   Weight: 69.4 kg (153 lb 1.6 oz)   Height: 1.6 m (5' 2.99\")    See OB flowsheet    Assessment/Plan     Encounter Diagnoses   Name Primary?     High-risk pregnancy, third trimester Yes     Benign gestational thrombocytopenia in third trimester (H)      Orders Placed This Encounter   Procedures     CBC with Platelets     Orders Placed This Encounter   Medications     ibuprofen (ADVIL/MOTRIN) 200 MG tablet     Sig: Take 3 tablets (600 mg) by mouth every 6 hours as needed for moderate pain (4-6)     Dispense:  100 tablet     Refill:  0     acetaminophen (TYLENOL) 500 MG tablet     Sig: Take 1-2 tablets (500-1,000 mg) by mouth every 6 hours as needed for mild pain     Dispense:  100 tablet     Refill:  0     docusate sodium (COLACE) 100 MG tablet     Sig: Take 1 tablet (100 mg) by mouth daily     Dispense:  100 tablet     Refill:  0       PHQ-9 SCORE 2022   PHQ-9 Total Score 1 1     GBS screening: Not indicated - present in urine. Plan prophylaxis in labor.  Birth preferences reviewed: Medicated, planning epidural.     Labor signs discussed. Reinforced daily fetal movement counts.  Reviewed why/how to contact provider if " headache/visual changes/RUQ or epigastric pain, decreased fetal movement, vaginal bleeding, leakage of fluid.    Return to clinic in 1 week and prn if questions or concerns.     KERRY WhartonM

## 2023-01-19 NOTE — PATIENT INSTRUCTIONS
Thank you for trusting us with your care!     If you need to contact us for questions about:  Symptoms, Scheduling & Medical Questions; Non-urgent (2-3 day response) [email or web address] message, Urgent (needing response today) [phone] (if after 3:30pm next day response)   Prescriptions: Please call your Pharmacy   Billing: Jigna [phone] or [facility]:[phone]       The Benefits of Breastmilk  Breastmilk is the best food for your baby. It has just the right amount of nutrients. It protects your baby's digestive system. It protects other bodysystems in your baby. And it helps them grow and develop.       Healthiest for baby  Breastmilk is the ideal food for babies. It has all the nutrients your babyneeds to grow healthy and strong.    Breastmilk has these benefits:    It lowers the risk for sudden infant death syndrome (SIDS).    It gives babies a lower risk for ear infections in their first year. This is compared to babies who are fed formula.    It has DHA. This is a type of fat. It helps your baby s growing brain, nervous system, and eyes.    It is full of antibodies. These help your baby fight infection.    It lowers your baby's risk for lung illness. It lowers their risk of diarrhea.    It lowers your baby s risk for allergies. Babies fed formula are more likely to have an allergy to cow's milk.    It lowers your baby s risk for colds and many other diseases.    It changes as your baby grows. This meets your baby's changing needs.  And it s important to know that:    Giving only breastmilk for the first 6 months gives your baby more of these benefits.    Giving breastmilk plus solid food from 6 months to 1 year or more gives more benefits.     babies have fewer long-term health problems when they grow up. These problems include diabetes and obesity.    Breastfeeding gives contact that your baby loves. Spending time skin-to-skin with you is calming and comforting.  Healthiest for mom  For many  people, breastfeeding is a good experience. It creates a strong bond between mother and baby. People who breastfeed also get health benefits. Somebenefits for you include:     You can know that your baby is growing healthy and strong because of your milk.    Breastmilk is convenient. It's free and clean. It's always at the right temperature.    Breastfeeding burns calories. This can help you lose pregnancy weight faster.    Breastfeeding releases hormones that contract the uterus. This helps the uterus return to its normal size after childbirth.    Mothers who breastfeed have a lower risk for ovarian and breast cancers.    Some studies have found that breastfeeding may reduce a person's risk for type 2 diabetes and rheumatoid arthritis. It may reduce the risk of cardiovascular disease. This includes high blood pressure and high cholesterol.    Breastfeeding every day delays the return of your menstrual period. This can help extend the time between pregnancies.  Many people can help you learn to breastfeed. A lactation consultant can help. This is a healthcare provider who is trained to help you breastfeed. Your nurse, midwife, nurse practitioner, obstetrician, pediatrician, or familypractice doctor can also help you learn about breastfeeding.   What does it mean to give only breastmilk?   Giving only breastmilk for at least the first 6 months of life is best for your baby. Feeding your baby from your breasts is best. If you need to be away from your baby, you can express breastmilk. This means pumping milk from your breast into a container. Talk with your healthcare provider about the best ways tofeed this milk to your baby.    You should not give your baby water, sugar water, formula, or solids duringtheir first 6 months unless your baby's healthcare provider tells you to.   Your baby s provider may tell you to give your baby vitamins, minerals, or medicines.  babies should be given vitamin D supplements.  The provider will tellyou the type and amount of vitamin D to give your baby.   What are the risks of not giving only breastmilk?   You now know the many of the benefits of breastfeeding. But you might not knowwhy it's important to give only breastmilk for at least 6 months.   Your baby gets the best protection against health problems when they get only breastmilk. Breastfeeding some of the time is good. But breastfeeding all ofthe time is best.   Giving your baby formula or other liquids may cause you to:    Have more problems breastfeeding    Make less milk    Be less confident in breastfeeding    Breastfeed less often    Stop breastfeeding before your baby is at least 12 months old                                                     When other options may be needed  Giving only breastmilk is almost always the best thing to do. But your healthcare provider may have reasons to advise giving your baby formula orother liquids. They include:     Your baby has a health problem. There are cases where you may need to add formula or other liquids. This is often only for a short time. This may be the case if your baby has low blood sugar (hypoglycemia), loses body fluids (dehydration), or has high levels of bilirubin.    You have certain health problems. Some infections can be passed from your skin to your baby's skin. Or it can pass through your breastmilk. People with HIV/AIDS or untreated and contagious TB (tuberculosis) should not breastfeed. Women with active skin sores from chickenpox (varicella) can pump their breastmilk and feed their baby. But they should keep their baby s skin from touching any of the sores.    You use illegal drugs or drink alcohol. People who use illegal drugs should not breastfeed. If you are going to have a drink that has alcohol, it's best to do so just after you nurse or pump milk. Breastfeeding or pumping breast milk is OK at least 4 hours after your last drink. That way, your body will  have some time to get rid of the alcohol before the next feeding. Less of it will reach your baby. Long-term exposure to alcohol in breastmilk may affect your baby's health. It may also cause you to make less milk.    You take certain medicines. If you take any medicines, ask your baby s healthcare provider if you can breastfeed.  [employer] last reviewed this educational content on2020-2022 [employer]. All rights reserved. This information is not intended as a substitute for professional medical care. Always follow yourhealthcare professional's instructions.            Vitamin K Deficiency Bleeding (VKDB) in a  Baby  What is vitamin K deficiency bleeding in a ?  Vitamin K deficiency bleeding (VKDB) is a problem that occurs in some  babies. It most often happens during the first few days and weeks of life. But it can occur up to 6 months of age.This condition used to be called hemorrhagic disease of the .    What causes vitamin K deficiency bleeding in a ?  Babies are normally born with low levels of vitamin K. Vitamin K is needed for blood to clot. Not having enough vitamin K is the main cause of vitamin deficiency bleeding. If your baby s blood doesn t clot, they may have severe bleeding or a hemorrhage. This can be life-threatening. The cause of vitamin K deficiency depends on the 3 types ofVKDB:     Early VKDB. This can occur right after birth or up to 24 hours of age. It's caused by certain medicines the mother took during pregnancy    Classical VKDB. This occurs from 1 to 7 days after birth. It's caused by low levels of vitamin K found in newborns.    Late VKDB. This most often occurs up to 3 months after birth. But it can occur up to 6 months after birth. It can occur in a baby who did not get a vitamin K shot at birth and who was  only.    Which newborns are at risk for vitamin K deficiency bleeding?   These things may make it more likely  for a baby to have this condition:     Not getting a vitamin K shot at birth.The American Academy of Pediatrics recommends that all newborns get a vitamin K shot. This can prevent severe bleeding.     Being  only and not getting a vitamin K shot at birth.  Breastmilk has less vitamin K than formula made with cow s milk. The vitamin K shot will provide what a  baby needs. A mother who takes a vitamin K supplement while breastfeeding will not raise her baby's vitamin K level.    Being born to a mother who took certain medicines during pregnancy.  These include medicines for seizures (anticonvulsants) and medicines for blood-clotting problems (anticoagulants).  What are the symptoms of vitamin K deficiency bleeding in a ?   Symptoms can occur a bit differently in each child. They can include:     Blood in your baby's stool that make it black and sticky (tarry)    Blood in your baby's urine    Oozing of blood from around your baby s umbilical cord or circumcision site    Bruising more easily than normal. This may happen around your baby's head and face.    Beingvery sleepy or fussy. In severe cases, vitamin K deficiency may cause bleeding in and around the brain. Other signs of bleeding in the brain can include seizures or vomiting, not just spitting up.  The symptoms of this condition may be similar to symptoms of other healthissues. Make sure your child sees a healthcare provider for a diagnosis.   How is vitamin K deficiency bleeding in a  diagnosed?   The healthcare provider will look at your baby's health history. They will also check your baby for signs of bleeding. Your baby may need lab tests to measure their blood clotting times. The results of these tests can help your child s healthcare provider make the diagnosis.   How is vitamin K deficiency bleeding in a  treated?   Treatment will depend on your child s symptoms, age, and general health. It will also depend on how  severe thecondition is.   Your baby will probably get a vitamin K shot.   Your baby may need a blood transfusion if they have severe bleeding. If your baby is severely ill, they may need to be treated in the intensive care unit (ICU).   What are possible complications of vitamin K deficiency bleeding in a ?   Vitamin K deficiency bleeding can lead to life-threatening problems. These include dangerous bleeding that can lead to brain damage or death. In the [place] deaths and long-term complications from vitamin K deficiency have been greatly lowered because of vitamin K shots given at birth. But bleeding into the brain, central nervous system, stomach, intestines, or other parts of the body cancause serious problems, or even death.   Can vitamin K deficiency bleeding in a  be prevented?   This condition can be prevented. The American Academy of Pediatrics recommends that all newborns get a vitamin K shot. Your child will get a shot into their upper leg (thigh) muscle. This shot will be given soon after birth. This will prevent dangerousbleeding.   Key points about vitamin K deficiency bleeding in a      Vitamin K deficiency bleeding is a problem that occurs in some newborns. It often happens during the first few days of life.    Babies are normally born with low levels of vitamin K. Not having enough vitamin K is the main cause of this condition.    Your child s healthcare provider will diagnose this condition. This will be based on your child s signs of bleeding and lab tests for blood clotting times.    The American Academy of Pediatrics recommends that all newborns get a vitamin K shot. This can prevent this condition.     Next steps  Tips to help you get the most from a visit to your child s healthcare provider:     Know the reason for the visit and what you want to happen.    Before your visit, write down questions you want answered.    At the visit, write down the name of a new diagnosis, and  any new medicines, treatments, or tests. Also write down any new instructions your provider gives you for your child.    Know why a new medicine or treatment is prescribed and how it will help your child. Also know what the side effects are.    Ask if your child s condition can be treated in other ways.    Know why a test or procedure is recommended and what the results could mean.    Know what to expect if your child does not take the medicine or have the test or procedure.    If your child has a follow-up appointment, write down the date, time, and purpose for that visit.    Know how you can contact your child s provider after office hours. This is important if your child becomes ill and you have questions or need advice.  [employer] last reviewed this educational content on4/1/2022 2000-2022 [employer]. All rights reserved. This information is not intended as a substitute for professional medical care. Always follow yourhealthcare professional's instructions.            Labor and Childbirth: Support Person's Notes  You may be excited and anxious about the impending labor and childbirth. You may also wonder how you can help. Learning about the birth process can help you know what to expect. And following the suggestions below can help ease you andthe mother through this exciting time.   During early labor    Be sure to time the contractions.    Keep the setting soothing. Dim lights and prevent loud noises. Try playing relaxing music.    Suggest that the mother soak in a warm tub to ease the pain of contractions.    Try to distract the mother from the contractions with a short walk or massage.    Encourage the mother to rest if she's tired.    As contractions become stronger, help her use labor breathing techniques.    During active labor    Have the mother walk or change position at least once an hour. This improves circulation and helps the baby descend.    Keep reminding the mother to breathe  and relax through each contraction.    Reassure her. Try to keep her from getting anxious or overstressed.    Take care of yourself. Take a short break to eat or go to the bathroom when you need to.    Rest when the mother does. You'll both benefit.    During a vaginal birth    Help the mother into a pushing position. Support her body as she pushes. A semi-sitting or semi-squatting position allows gravity to assist the birth.    Remind her to rest between contractions. Encourage her by telling her when the baby's head appears.    Keep in mind that you may be masked and gowned for the birth, depending on hospital policy.    During a  birth    You will most likely be able to stay with the mother during the . If you remain with her, you'll wear a mask and surgical gown.    Remember that  birth is surgery. The mother's abdominal area will be draped and out of view. Don't touch the draped areas, which are sterile.    If you aren't allowed to attend the delivery or aren't comfortable doing so, wait with other friends and family members in the family waiting area.    [employer] last reviewed this educational content on2021-2022 [employer]. All rights reserved. This information is not intended as a substitute for professional medical care. Always follow yourhealthcare professional's instructions.

## 2023-01-24 ENCOUNTER — LAB (OUTPATIENT)
Dept: LAB | Facility: CLINIC | Age: 33
End: 2023-01-24
Attending: ADVANCED PRACTICE MIDWIFE
Payer: COMMERCIAL

## 2023-01-24 ENCOUNTER — OFFICE VISIT (OUTPATIENT)
Dept: OBGYN | Facility: CLINIC | Age: 33
End: 2023-01-24
Attending: ADVANCED PRACTICE MIDWIFE
Payer: COMMERCIAL

## 2023-01-24 VITALS
HEART RATE: 98 BPM | WEIGHT: 155.4 LBS | SYSTOLIC BLOOD PRESSURE: 106 MMHG | DIASTOLIC BLOOD PRESSURE: 70 MMHG | BODY MASS INDEX: 28.6 KG/M2 | HEIGHT: 62 IN

## 2023-01-24 DIAGNOSIS — O99.113 BENIGN GESTATIONAL THROMBOCYTOPENIA IN THIRD TRIMESTER (H): ICD-10-CM

## 2023-01-24 DIAGNOSIS — D69.6 BENIGN GESTATIONAL THROMBOCYTOPENIA IN THIRD TRIMESTER (H): ICD-10-CM

## 2023-01-24 DIAGNOSIS — O09.93 HIGH-RISK PREGNANCY, THIRD TRIMESTER: Primary | ICD-10-CM

## 2023-01-24 LAB
ERYTHROCYTE [DISTWIDTH] IN BLOOD BY AUTOMATED COUNT: 14.1 % (ref 10–15)
HCT VFR BLD AUTO: 34.6 % (ref 35–47)
HGB BLD-MCNC: 11.7 G/DL (ref 11.7–15.7)
MCH RBC QN AUTO: 30.2 PG (ref 26.5–33)
MCHC RBC AUTO-ENTMCNC: 33.8 G/DL (ref 31.5–36.5)
MCV RBC AUTO: 89 FL (ref 78–100)
PLAT MORPH BLD: NORMAL
PLATELET # BLD AUTO: 109 10E3/UL (ref 150–450)
RBC # BLD AUTO: 3.87 10E6/UL (ref 3.8–5.2)
RBC MORPH BLD: NORMAL
WBC # BLD AUTO: 6.9 10E3/UL (ref 4–11)

## 2023-01-24 PROCEDURE — 99207 PR PRENATAL VISIT: CPT | Performed by: ADVANCED PRACTICE MIDWIFE

## 2023-01-24 PROCEDURE — G0463 HOSPITAL OUTPT CLINIC VISIT: HCPCS | Performed by: ADVANCED PRACTICE MIDWIFE

## 2023-01-24 PROCEDURE — 36415 COLL VENOUS BLD VENIPUNCTURE: CPT

## 2023-01-24 PROCEDURE — 85027 COMPLETE CBC AUTOMATED: CPT

## 2023-01-24 NOTE — LETTER
"2023       RE: Dariana Castellon  [address]  [place] [address]     Dear Colleague,    Thank you for referring your patient, Dariana Castellon, to the [facility]. Please see a copy of my visit note below.    Subjective:     33 year old  at 38w1d presents for routine prenatal visit.  Denies vaginal bleeding or leakage of fluid.  Denies contractions, reports [place] Bradshaw.  Reports good fetal movement.       No HA, visual changes, RUQ or epigastric pain.   Patient concerns: Feeling well overall.   - FGR resolved    - Thrombocytopenia, plts 117 > 128    Objective:  Vitals:    23 1438   BP: 106/70   BP Location: Left arm   Patient Position: Chair   Pulse: 98   Weight: 70.5 kg (155 lb 6.4 oz)   Height: 1.575 m (5' 2\")   See OB flowsheet    Assessment/Plan:   Encounter Diagnoses   Name Primary?     High-risk pregnancy, third trimester Yes     Benign gestational thrombocytopenia in third trimester (H)      Orders Placed This Encounter   Procedures     CBC with Platelets     - Reviewed postdates testing including BPP => 41 weeks and rationale for induction of labor based on results.  Patient open to induction or postdates testing.  - Reviewed why/how to contact provider if headache/visual changes/RUQ or epigastric pain, decreased fetal movement, vaginal bleeding, leakage of fluid or strong/regular contractions.  - Patient education/orders or handouts today: Sign/symptoms of labor, When to call for labor or other concerns and Induction of labor  - Repeat CBC today  - Return to clinic in 1 week and prn if questions or concerns.     I, TAMANNA Stock, am serving as a scribe to document services personally performed by CNM based on the provider's statements to me.\" - TAMANNA Stock  The encounter was performed by me and scribed by the SNM. The scribed note accurately reflects my personal services " and decisions made by me. - KERRY Suarez CNM                  Again, thank you for allowing me to participate in the care of your patient.      Sincerely,    KERRY Suarez CNM

## 2023-01-24 NOTE — PROGRESS NOTES
"Subjective:     33 year old  at 38w1d presents for routine prenatal visit.  Denies vaginal bleeding or leakage of fluid.  Denies contractions, reports [place] Bradshaw.  Reports good fetal movement.       No HA, visual changes, RUQ or epigastric pain.   Patient concerns: Feeling well overall.   - FGR resolved    - Thrombocytopenia, plts 117 > 128    Objective:  Vitals:    23 1438   BP: 106/70   BP Location: Left arm   Patient Position: Chair   Pulse: 98   Weight: 70.5 kg (155 lb 6.4 oz)   Height: 1.575 m (5' 2\")   See OB flowsheet    Assessment/Plan:   Encounter Diagnoses   Name Primary?     High-risk pregnancy, third trimester Yes     Benign gestational thrombocytopenia in third trimester (H)      Orders Placed This Encounter   Procedures     CBC with Platelets     - Reviewed postdates testing including BPP => 41 weeks and rationale for induction of labor based on results.  Patient open to induction or postdates testing.  - Reviewed why/how to contact provider if headache/visual changes/RUQ or epigastric pain, decreased fetal movement, vaginal bleeding, leakage of fluid or strong/regular contractions.  - Patient education/orders or handouts today: Sign/symptoms of labor, When to call for labor or other concerns and Induction of labor  - Repeat CBC today  - Return to clinic in 1 week and prn if questions or concerns.     I, TAMANNA Stock, am serving as a scribe to document services personally performed by CNM based on the provider's statements to me.\" - TAMANNA Stock  The encounter was performed by me and scribed by the SNM. The scribed note accurately reflects my personal services and decisions made by me. - KERRY Suarez CNM              "

## 2023-01-24 NOTE — LETTER
Date:January 25, 2023      Provider requested that no letter be sent. Do not send.       [facility]

## 2023-01-24 NOTE — PATIENT INSTRUCTIONS
Thank you for trusting us with your care!     If you need to contact us for questions about:  Symptoms, Scheduling & Medical Questions; Non-urgent (2-3 day response) [facility] message, Urgent (needing response today) [phone] (if after 3:30pm next day response)   Prescriptions: Please call your Pharmacy   Billing: Jigna [phone] or [facility]:[phone]

## 2023-02-03 ENCOUNTER — OFFICE VISIT (OUTPATIENT)
Dept: OBGYN | Facility: CLINIC | Age: 33
End: 2023-02-03
Payer: COMMERCIAL

## 2023-02-03 ENCOUNTER — LAB (OUTPATIENT)
Dept: LAB | Facility: CLINIC | Age: 33
End: 2023-02-03
Payer: COMMERCIAL

## 2023-02-03 VITALS
DIASTOLIC BLOOD PRESSURE: 71 MMHG | WEIGHT: 155 LBS | BODY MASS INDEX: 28.35 KG/M2 | HEART RATE: 87 BPM | SYSTOLIC BLOOD PRESSURE: 105 MMHG

## 2023-02-03 DIAGNOSIS — O99.113 BENIGN GESTATIONAL THROMBOCYTOPENIA IN THIRD TRIMESTER (H): Primary | ICD-10-CM

## 2023-02-03 DIAGNOSIS — D69.6 BENIGN GESTATIONAL THROMBOCYTOPENIA IN THIRD TRIMESTER (H): ICD-10-CM

## 2023-02-03 DIAGNOSIS — D69.6 BENIGN GESTATIONAL THROMBOCYTOPENIA IN THIRD TRIMESTER (H): Primary | ICD-10-CM

## 2023-02-03 DIAGNOSIS — O99.113 BENIGN GESTATIONAL THROMBOCYTOPENIA IN THIRD TRIMESTER (H): ICD-10-CM

## 2023-02-03 LAB
ERYTHROCYTE [DISTWIDTH] IN BLOOD BY AUTOMATED COUNT: 13.9 % (ref 10–15)
HCT VFR BLD AUTO: 35.1 % (ref 35–47)
HGB BLD-MCNC: 11.7 G/DL (ref 11.7–15.7)
MCH RBC QN AUTO: 30.2 PG (ref 26.5–33)
MCHC RBC AUTO-ENTMCNC: 33.3 G/DL (ref 31.5–36.5)
MCV RBC AUTO: 91 FL (ref 78–100)
PLATELET # BLD AUTO: 112 10E3/UL (ref 150–450)
RBC # BLD AUTO: 3.87 10E6/UL (ref 3.8–5.2)
WBC # BLD AUTO: 8 10E3/UL (ref 4–11)

## 2023-02-03 PROCEDURE — 36415 COLL VENOUS BLD VENIPUNCTURE: CPT

## 2023-02-03 PROCEDURE — 59425 ANTEPARTUM CARE ONLY: CPT | Mod: GE | Performed by: STUDENT IN AN ORGANIZED HEALTH CARE EDUCATION/TRAINING PROGRAM

## 2023-02-03 PROCEDURE — 59025 FETAL NON-STRESS TEST: CPT | Mod: 26 | Performed by: STUDENT IN AN ORGANIZED HEALTH CARE EDUCATION/TRAINING PROGRAM

## 2023-02-03 PROCEDURE — G0463 HOSPITAL OUTPT CLINIC VISIT: HCPCS | Performed by: STUDENT IN AN ORGANIZED HEALTH CARE EDUCATION/TRAINING PROGRAM

## 2023-02-03 PROCEDURE — 85027 COMPLETE CBC AUTOMATED: CPT

## 2023-02-03 ASSESSMENT — PAIN SCALES - GENERAL: PAINLEVEL: NO PAIN (0)

## 2023-02-03 NOTE — PROGRESS NOTES
[facility]   Prenatal Care Visit  SUBJECTIVE   Dariana Castellon is a 33 year old  at 39w4d by 13w0d US, here for return OB visit.     She is feeling well.  Denies contractions, leakage of fluid, vaginal bleeding, headache, chest pain, shortness of breath, vision changes, LE swelling. Hoping to go into labor soon. Requests membrane sweep today.     Pregnancy notable for :   - Gestational thrombocytopenia  - Hx VAVD for fetal bradycardia with 3rd degree laceration    OBJECTIVE   /71   Pulse 87   Wt 70.3 kg (155 lb)   LMP 2022   BMI 28.35 kg/m      Gen: NAD  Fundal height: 37 cm  FHT: 140 bpm  Pelvic: cervix 2/60/-2, membrane sweep performed    Total weight gain: 7.258 kg (16 lb)     Labs:   Results for orders placed or performed in visit on 23   CBC with Platelets     Status: Abnormal   Result Value Ref Range    WBC Count 8.0 4.0 - 11.0 10e3/uL    RBC Count 3.87 3.80 - 5.20 10e6/uL    Hemoglobin 11.7 11.7 - 15.7 g/dL    Hematocrit 35.1 35.0 - 47.0 %    MCV 91 78 - 100 fL    MCH 30.2 26.5 - 33.0 pg    MCHC 33.3 31.5 - 36.5 g/dL    RDW 13.9 10.0 - 15.0 %    Platelet Count 112 (L) 150 - 450 10e3/uL       ASSESSMENT & PLAN   33 year old  at 39w4d, PAULIE.    # Prenatal care  # Gestational thrombocytopenia    Rh pos, kelsi neg, rubella imm, infectious labs within normal limits. GCT wnl    Third trimester labs notable for gestational thrombocytopenia. Plts 117>129>109 and 111 today. We reviewed that, given Dariana's desire for an epidural, it is reasonable to induce labor at any time as the may not be able to get an epidural if her plts are <100 at presentation in spontaneous labor. She appreciates the information, will continue to think about it. For now, hoping for spontaneous labor.     s/p Tdap, flu vaccine    GBS bacteriuria in 2022, will need PCN in labor    Appropriate weight gain (pre-pregnancy BMI 25)    # Genetic screening &  diagnosis    Normal NT, Level 2 US wnl    No  growth scans indicated, aware BPP     Growth scans, aware BPP indicated if >41w    # Birth and postpartum preferences    Discussed when to present to triage, labor warning signs    Discussed pain management options, patient plans epidural    Plans breast feeding    Post-partum contraception: undecided, possibly paragard    RTC 1 week    Staffed with Dr. Gary Purvis MD MSc  OBGYN Resident, PGY4  02/03/2023 2:52 PM

## 2023-02-03 NOTE — LETTER
Date:February 6, 2023      Provider requested that no letter be sent. Do not send.       [facility]

## 2023-02-03 NOTE — LETTER
2/3/2023       RE: Dariana Castellon  [address]  [place] [address]     Dear Colleague,    Thank you for referring your patient, Dariana Castellon, to the [facility] [place] [facility]. Please see a copy of my visit note below.    [facility]   Prenatal Care Visit  SUBJECTIVE   Dariana Castellon is a 33 year old  at 39w4d by 13w0d US, here for return OB visit.     She is feeling well.  Denies contractions, leakage of fluid, vaginal bleeding, headache, chest pain, shortness of breath, vision changes, LE swelling. Hoping to go into labor soon. Requests membrane sweep today.     Pregnancy notable for :   - Gestational thrombocytopenia  - Hx VAVD for fetal bradycardia with 3rd degree laceration    OBJECTIVE   /71   Pulse 87   Wt 70.3 kg (155 lb)   LMP 2022   BMI 28.35 kg/m      Gen: NAD  Fundal height: 37 cm  FHT: 140 bpm  Pelvic: cervix 2/60/-2, membrane sweep performed    Total weight gain: 7.258 kg (16 lb)     Labs:   Results for orders placed or performed in visit on 23   CBC with Platelets     Status: Abnormal   Result Value Ref Range    WBC Count 8.0 4.0 - 11.0 10e3/uL    RBC Count 3.87 3.80 - 5.20 10e6/uL    Hemoglobin 11.7 11.7 - 15.7 g/dL    Hematocrit 35.1 35.0 - 47.0 %    MCV 91 78 - 100 fL    MCH 30.2 26.5 - 33.0 pg    MCHC 33.3 31.5 - 36.5 g/dL    RDW 13.9 10.0 - 15.0 %    Platelet Count 112 (L) 150 - 450 10e3/uL       ASSESSMENT & PLAN   33 year old  at 39w4d, PAULIE.    # Prenatal care  # Gestational thrombocytopenia    Rh pos, kelsi neg, rubella imm, infectious labs within normal limits. GCT wnl    Third trimester labs notable for gestational thrombocytopenia. Plts 117>129>109 and 111 today. We reviewed that, given Dariana's desire for an epidural, it is reasonable to induce labor at any time as the may not be able to get an epidural if her plts are <100 at presentation in spontaneous labor. She  appreciates the information, will continue to think about it. For now, hoping for spontaneous labor.     s/p Tdap, flu vaccine    GBS bacteriuria in 2022, will need PCN in labor    Appropriate weight gain (pre-pregnancy BMI 25)    # Genetic screening &  diagnosis    Normal NT, Level 2 US wnl    No growth scans indicated, aware BPP     Growth scans, aware BPP indicated if >41w    # Birth and postpartum preferences    Discussed when to present to triage, labor warning signs    Discussed pain management options, patient plans epidural    Plans breast feeding    Post-partum contraception: undecided, possibly paragard    RTC 1 week    Staffed with Dr. Gary Purvis MD MSc  OBGYN Resident, PGY4  2023 2:52 PM      Attestation signed by Syl Vega MD at 2023  1:35 PM:  OBGYN Attending Addendum    Yanianna NIKHIL Castellon was seen by the resident, Dr. Purvis, in [facility]. I, Syl Vega, reviewed the history and examination. The assessment and plan were made jointly between myself and Dr. Purvis.     at 39w4d here for prenatal care. Pregnancy c/b thrombocytopenia, plts 111K today. Induction offered given desire for epidural and possiblity of plts continuing to fall; Dariana prefers to await spontaneous labor. Return in 1 week if undelivered.     Syl Vega MD, MSCI    [facility]  2023        Again, thank you for allowing me to participate in the care of your patient.      Sincerely,    Valarie Purvis MD

## 2023-02-03 NOTE — NURSING NOTE
Chief Complaint   Patient presents with     Prenatal Care     PAULIE 39 weeks and 1 day   Abbie Ching LPN

## 2023-02-03 NOTE — PATIENT INSTRUCTIONS
Thank you for trusting us with your care!     If you need to contact us for questions about:  Symptoms, Scheduling & Medical Questions; Non-urgent (2-3 day response) Vianey message, Urgent (needing response today) [phone] (if after 3:30pm next day response)   Prescriptions: Please call your Pharmacy   Billing: Jigna [phone] or [facility]:[phone]

## 2023-02-04 ENCOUNTER — HOSPITAL ENCOUNTER (OUTPATIENT)
Facility: CLINIC | Age: 33
Discharge: HOME | End: 2023-02-04
Attending: MIDWIFE | Admitting: MIDWIFE
Payer: COMMERCIAL

## 2023-02-04 ENCOUNTER — HOSPITAL ENCOUNTER (INPATIENT)
Facility: CLINIC | Age: 33
LOS: 3 days | Discharge: HOME | End: 2023-02-07
Attending: ADVANCED PRACTICE MIDWIFE | Admitting: ADVANCED PRACTICE MIDWIFE
Payer: COMMERCIAL

## 2023-02-04 VITALS — WEIGHT: 153 LBS | RESPIRATION RATE: 16 BRPM | HEIGHT: 63 IN | TEMPERATURE: 98.6 F | BODY MASS INDEX: 27.11 KG/M2

## 2023-02-04 DIAGNOSIS — O09.93 HIGH-RISK PREGNANCY, THIRD TRIMESTER: Primary | ICD-10-CM

## 2023-02-04 DIAGNOSIS — O26.813 PREGNANCY RELATED FATIGUE IN THIRD TRIMESTER: ICD-10-CM

## 2023-02-04 DIAGNOSIS — R09.81 NASAL CONGESTION: ICD-10-CM

## 2023-02-04 DIAGNOSIS — D64.9 ANEMIA, UNSPECIFIED TYPE: ICD-10-CM

## 2023-02-04 LAB
ABO/RH(D): NORMAL
ANTIBODY SCREEN: NEGATIVE
SPECIMEN EXPIRATION DATE: NORMAL

## 2023-02-04 PROCEDURE — U0003 INFECTIOUS AGENT DETECTION BY NUCLEIC ACID (DNA OR RNA); SEVERE ACUTE RESPIRATORY SYNDROME CORONAVIRUS 2 (SARS-COV-2) (CORONAVIRUS DISEASE [COVID-19]), AMPLIFIED PROBE TECHNIQUE, MAKING USE OF HIGH THROUGHPUT TECHNOLOGIES AS DESCRIBED BY CMS-2020-01-R: HCPCS | Performed by: ADVANCED PRACTICE MIDWIFE

## 2023-02-04 PROCEDURE — 722N000001 HC LABOR CARE VAGINAL DELIVERY SINGLE

## 2023-02-04 PROCEDURE — 258N000003 HC RX IP 258 OP 636: Performed by: ADVANCED PRACTICE MIDWIFE

## 2023-02-04 PROCEDURE — C9803 HOPD COVID-19 SPEC COLLECT: HCPCS

## 2023-02-04 PROCEDURE — G0463 HOSPITAL OUTPT CLINIC VISIT: HCPCS

## 2023-02-04 PROCEDURE — 250N000011 HC RX IP 250 OP 636: Performed by: ADVANCED PRACTICE MIDWIFE

## 2023-02-04 PROCEDURE — 99207 PR NO BILLABLE SERVICE THIS VISIT: CPT | Performed by: MIDWIFE

## 2023-02-04 PROCEDURE — 85027 COMPLETE CBC AUTOMATED: CPT | Performed by: ADVANCED PRACTICE MIDWIFE

## 2023-02-04 PROCEDURE — 86901 BLOOD TYPING SEROLOGIC RH(D): CPT | Performed by: ADVANCED PRACTICE MIDWIFE

## 2023-02-04 PROCEDURE — 250N000013 HC RX MED GY IP 250 OP 250 PS 637: Performed by: MIDWIFE

## 2023-02-04 PROCEDURE — 120N000002 HC R&B MED SURG/OB UMMC

## 2023-02-04 PROCEDURE — 86780 TREPONEMA PALLIDUM: CPT | Performed by: ADVANCED PRACTICE MIDWIFE

## 2023-02-04 RX ORDER — METHYLERGONOVINE MALEATE 0.2 MG/ML
200 INJECTION INTRAVENOUS
Status: DISCONTINUED | OUTPATIENT
Start: 2023-02-04 | End: 2023-02-05

## 2023-02-04 RX ORDER — ONDANSETRON 4 MG/1
4 TABLET, ORALLY DISINTEGRATING ORAL EVERY 6 HOURS PRN
Status: DISCONTINUED | OUTPATIENT
Start: 2023-02-04 | End: 2023-02-05

## 2023-02-04 RX ORDER — METOCLOPRAMIDE HYDROCHLORIDE 5 MG/ML
10 INJECTION INTRAMUSCULAR; INTRAVENOUS EVERY 6 HOURS PRN
Status: DISCONTINUED | OUTPATIENT
Start: 2023-02-04 | End: 2023-02-05

## 2023-02-04 RX ORDER — MISOPROSTOL 200 UG/1
800 TABLET ORAL
Status: DISCONTINUED | OUTPATIENT
Start: 2023-02-04 | End: 2023-02-05

## 2023-02-04 RX ORDER — OXYTOCIN 10 [USP'U]/ML
10 INJECTION, SOLUTION INTRAMUSCULAR; INTRAVENOUS
Status: DISCONTINUED | OUTPATIENT
Start: 2023-02-04 | End: 2023-02-05

## 2023-02-04 RX ORDER — NALOXONE HYDROCHLORIDE 0.4 MG/ML
0.4 INJECTION, SOLUTION INTRAMUSCULAR; INTRAVENOUS; SUBCUTANEOUS
Status: DISCONTINUED | OUTPATIENT
Start: 2023-02-04 | End: 2023-02-05

## 2023-02-04 RX ORDER — KETOROLAC TROMETHAMINE 30 MG/ML
30 INJECTION, SOLUTION INTRAMUSCULAR; INTRAVENOUS
Status: DISCONTINUED | OUTPATIENT
Start: 2023-02-04 | End: 2023-02-05

## 2023-02-04 RX ORDER — NALOXONE HYDROCHLORIDE 0.4 MG/ML
0.2 INJECTION, SOLUTION INTRAMUSCULAR; INTRAVENOUS; SUBCUTANEOUS
Status: DISCONTINUED | OUTPATIENT
Start: 2023-02-04 | End: 2023-02-05

## 2023-02-04 RX ORDER — VITAMIN A ACETATE, .BETA.-CAROTENE, ASCORBIC ACID, CHOLECALCIFEROL, .ALPHA.-TOCOPHEROL ACETATE, DL-, THIAMINE MONONITRATE, RIBOFLAVIN, NIACINAMIDE, PYRIDOXINE HYDROCHLORIDE, FOLIC ACID, CYANOCOBALAMIN, CALCIUM CARBONATE, FERROUS FUMARATE, ZINC OXIDE, AND CUPRIC OXIDE 2000; 2000; 120; 400; 22; 1.84; 3; 20; 10; 1; 12; 200; 27; 25; 2 [IU]/1; [IU]/1; MG/1; [IU]/1; MG/1; MG/1; MG/1; MG/1; MG/1; MG/1; UG/1; MG/1; MG/1; MG/1; MG/1
1 TABLET ORAL DAILY
COMMUNITY

## 2023-02-04 RX ORDER — HYDROXYZINE HYDROCHLORIDE 25 MG/1
25 TABLET, FILM COATED ORAL ONCE
Status: COMPLETED | OUTPATIENT
Start: 2023-02-04 | End: 2023-02-04

## 2023-02-04 RX ORDER — TRANEXAMIC ACID 10 MG/ML
1 INJECTION, SOLUTION INTRAVENOUS EVERY 30 MIN PRN
Status: DISCONTINUED | OUTPATIENT
Start: 2023-02-04 | End: 2023-02-05

## 2023-02-04 RX ORDER — CITRIC ACID/SODIUM CITRATE 334-500MG
30 SOLUTION, ORAL ORAL
Status: DISCONTINUED | OUTPATIENT
Start: 2023-02-04 | End: 2023-02-05

## 2023-02-04 RX ORDER — PROCHLORPERAZINE MALEATE 10 MG
10 TABLET ORAL EVERY 6 HOURS PRN
Status: DISCONTINUED | OUTPATIENT
Start: 2023-02-04 | End: 2023-02-05

## 2023-02-04 RX ORDER — METOCLOPRAMIDE 10 MG/1
10 TABLET ORAL EVERY 6 HOURS PRN
Status: DISCONTINUED | OUTPATIENT
Start: 2023-02-04 | End: 2023-02-05

## 2023-02-04 RX ORDER — OXYTOCIN/0.9 % SODIUM CHLORIDE 30/500 ML
100-340 PLASTIC BAG, INJECTION (ML) INTRAVENOUS CONTINUOUS PRN
Status: DISCONTINUED | OUTPATIENT
Start: 2023-02-04 | End: 2023-02-05

## 2023-02-04 RX ORDER — MISOPROSTOL 200 UG/1
400 TABLET ORAL
Status: DISCONTINUED | OUTPATIENT
Start: 2023-02-04 | End: 2023-02-05

## 2023-02-04 RX ORDER — HYDROXYZINE HYDROCHLORIDE 25 MG/1
25 TABLET, FILM COATED ORAL EVERY 8 HOURS PRN
Qty: 10 TABLET | Refills: 0 | Status: ON HOLD | OUTPATIENT
Start: 2023-02-04 | End: 2023-02-06

## 2023-02-04 RX ORDER — MULTIVIT-MIN/IRON/FOLIC ACID/K 18-600-40
CAPSULE ORAL
Status: ON HOLD | COMMUNITY
End: 2023-02-06

## 2023-02-04 RX ORDER — PROCHLORPERAZINE 25 MG
25 SUPPOSITORY, RECTAL RECTAL EVERY 12 HOURS PRN
Status: DISCONTINUED | OUTPATIENT
Start: 2023-02-04 | End: 2023-02-05

## 2023-02-04 RX ORDER — IBUPROFEN 800 MG/1
800 TABLET, FILM COATED ORAL
Status: DISCONTINUED | OUTPATIENT
Start: 2023-02-04 | End: 2023-02-05

## 2023-02-04 RX ORDER — LIDOCAINE 40 MG/G
CREAM TOPICAL
Status: DISCONTINUED | OUTPATIENT
Start: 2023-02-04 | End: 2023-02-04 | Stop reason: HOSPADM

## 2023-02-04 RX ORDER — OXYTOCIN/0.9 % SODIUM CHLORIDE 30/500 ML
340 PLASTIC BAG, INJECTION (ML) INTRAVENOUS CONTINUOUS PRN
Status: DISCONTINUED | OUTPATIENT
Start: 2023-02-04 | End: 2023-02-05

## 2023-02-04 RX ORDER — CARBOPROST TROMETHAMINE 250 UG/ML
250 INJECTION, SOLUTION INTRAMUSCULAR
Status: DISCONTINUED | OUTPATIENT
Start: 2023-02-04 | End: 2023-02-05

## 2023-02-04 RX ORDER — PENICILLIN G POTASSIUM 5000000 [IU]/1
5 INJECTION, POWDER, FOR SOLUTION INTRAMUSCULAR; INTRAVENOUS ONCE
Status: COMPLETED | OUTPATIENT
Start: 2023-02-05 | End: 2023-02-05

## 2023-02-04 RX ORDER — ONDANSETRON 2 MG/ML
4 INJECTION INTRAMUSCULAR; INTRAVENOUS EVERY 6 HOURS PRN
Status: DISCONTINUED | OUTPATIENT
Start: 2023-02-04 | End: 2023-02-05

## 2023-02-04 RX ORDER — PENICILLIN G 3000000 [IU]/50ML
3 INJECTION, SOLUTION INTRAVENOUS EVERY 4 HOURS
Status: DISCONTINUED | OUTPATIENT
Start: 2023-02-05 | End: 2023-02-05

## 2023-02-04 RX ADMIN — HYDROXYZINE HYDROCHLORIDE 25 MG: 25 TABLET, FILM COATED ORAL at 09:57

## 2023-02-04 RX ADMIN — PENICILLIN G POTASSIUM 5 MILLION UNITS: 5000000 POWDER, FOR SOLUTION INTRAMUSCULAR; INTRAPLEURAL; INTRATHECAL; INTRAVENOUS at 23:50

## 2023-02-04 RX ADMIN — SODIUM CHLORIDE, POTASSIUM CHLORIDE, SODIUM LACTATE AND CALCIUM CHLORIDE 1000 ML: 600; 310; 30; 20 INJECTION, SOLUTION INTRAVENOUS at 23:51

## 2023-02-04 ASSESSMENT — ACTIVITIES OF DAILY LIVING (ADL)
ADLS_ACUITY_SCORE: 35
ADLS_ACUITY_SCORE: 35

## 2023-02-04 NOTE — H&P
HOSPITAL TRIAGE NOTE  ===================    CHIEF COMPLAINT  ========================  Dariana Castellon is a 33 year old patient presenting today at 39w5d for evaluation of uterine contractions.    Patient's last menstrual period was 2022.  Estimated Date of Delivery: 2023     HPI  ==================   Pt reports contractions on and off all night  Now currently only 1 in past 40 min   Prenatal record and labs reviewed from [facility], through [employer] EMR.    CONTRACTIONS: none  ABDOMINAL PAIN: none  FETAL MOVEMENT: active    VAGINAL BLEEDING: none  RUPTURE OF MEMBRANES: no  PELVIC PAIN: none    PREGNANCY COMPLICATIONS: gestational thrombocytopenia     # Pain Assessment:  Current Pain Score 2023   Patient currently in pain? denies       REVIEW OF SYSTEMS  =====================  C: NEGATIVE for fever, chills  I: NEGATIVE for worrisome rashes, moles or lesions  E: NEGATIVE for vision changes or irritation  R: NEGATIVE for significant cough or SOB  CV: NEGATIVE for chest pain, palpitations or varicosities  GI: NEGATIVE for nausea, abdominal pain, heartburn, or change in bowel habits  : NEGATIVE for frequency, dysuria, or hematuria  M: NEGATIVE for significant arthralgias or myalgia  N: NEGATIVE for headache, weakness, dizziness or paresthesias  P: NEGATIVE for changes in mood or affect    PROBLEM LIST  ===============  Patient Active Problem List    Diagnosis Date Noted     Gestational thrombocytopenia (H) 2023     Priority: Medium     22:   23: recommended weekly platelets, repeat 128       High-risk pregnancy, third trimester 2022     Priority: Medium     ALBINA at 33 weeks  [facility] CNM pt  Partner's name: Alfredito  [x] NOB folder  [x ] Dating  [ ] First tri screen ordered; declined  [ ] QS/AFP ordered declined  [ ] Fetal anatomy US ordered  [x] Rubella immune  [ ] Hep B immune/nonimmune  [ ] Pap  [NA] Started ASA   [x] NO plan utox in labor   [x] COVID  vaccine completed  _____________________________________  [x] EOB folder  [x ] PP Contraception plan: undecided, maybe paragard.   [x] Labor plans: epidural.  and sister.   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 Elevated fetal hemoglobin in adults and children greater than 12 months old may be seen in many conditions, the most common being pregnancy, hereditary persistence of hemoglobin F, and thalassemia.         HISTORIES  ==============  ALLERGIES:    No Known Allergies  PAST MEDICAL HISTORY  History reviewed. No pertinent past medical history.  SOCIAL HISTORY  Social History     Socioeconomic History     Marital status:      Spouse name: Alfredito     Number of children: Not on file     Years of education: Not on file     Highest education level: Not on file   Occupational History     Not on file   Tobacco Use     Smoking status: Never     Smokeless tobacco: Never   Vaping Use     Vaping Use: Never used   Substance and Sexual Activity     Alcohol use: Never     Drug use: Never     Sexual activity: Not on file   Other Topics Concern     Not on file   Social History Narrative     Not on file     Social Determinants of Health     Financial Resource Strain: Not on file   Food Insecurity: Not on file  "  Transportation Needs: Not on file   Physical Activity: Not on file   Stress: Not on file   Social Connections: Not on file   Intimate Partner Violence: Not At Risk     Fear of Current or Ex-Partner: No     Emotionally Abused: No     Physically Abused: No     Sexually Abused: No   Housing Stability: Not on file     PARTNER: here     FAMILY HISTORY  History reviewed. No pertinent family history.  OB HISTORY  OB History    Para Term  AB Living   3 2 2 0 0 2   SAB IAB Ectopic Multiple Live Births   0 0 0 0 2      # Outcome Date GA Lbr Ang/2nd Weight Sex Delivery Anes PTL Lv   3 Current            2 Term 21 40w5d / 00:13 2.995 kg (6 lb 9.6 oz) M Vag-Spont EPI N JAN      Name: ZACH BENAVIDEZ      Apgar1: 9  Apgar5: 9   1 Term 18 39w1d  2.863 kg (6 lb 5 oz) F Vag-Vacuum EPI  JAN      Complications: Disorder involving thrombocytopenia (H)      Name: [facility]      Obstetric Comments   Breastfeed- 1+ year     Prenatal Labs:   Lab Results   Component Value Date    HGB 11.7 2023     EXAM  ============  Temp 98.6  F (37  C) (Oral)   Resp 16   Ht 1.6 m (5' 3\")   Wt 69.4 kg (153 lb)   LMP 2022   BMI 27.10 kg/m    GENERAL APPEARANCE: healthy, alert and no distress  RESP: lungs clear to auscultation - no rales, rhonchi or wheezes  CV: regular rates and rhythm, normal S1 S2, no S3 or S4 and no murmur,and no varicosities  ABDOMEN:  soft, nontender, no epigastric pain  SKIN: no suspicious lesions or rashes  NEURO: Denies headache, blurred vision, other vision changes  PSYCH: mentation appears normal. and affect normal/bright  MS/ LEGS: Reflexes normal bilaterally    CONTRACTIONS: not felt by patient   FETAL HEART TONES: continuous EFM- baseline 140 with moderate variability and positive accelerations. No decelerations.  NST: REACTIVE    PELVIC EXAM: 2/ 60%/ Posterior/ average/ -3 cephalic   PRESENTATION: VERTEX  BLOOD: no  DISCHARGE: none    ROM: no  ROMPLUS: not done    LABS: " none    DIAGNOSIS  ============  39w5d seen on the Birthplace Triage for labor evaluation- prodromal   NST: REACTIVE  Fetal Heart rate tracing:category one    PLAN  ============  Call or return to the Birthplace with contractions, cramping, abdominal or pelvic pain, vaginal bleeding, leaking fluid or decreased fetal movement.  Follow up- Monday as scheduled   KERRY Pardo CNM

## 2023-02-04 NOTE — PROGRESS NOTES
The patient presented to Triage with c/o contractions that began approximately 6 PM q 20 minutes and continued through the night until 0600 when they picked up in frequency to every 5-7 minutes. She denies any bleeding or LOF and reports usual FM. The patient reports low PO fluid intake and was provided with a pitcher of water. She reports her contractions spaced out once getting to the hospital, she reported 3 contractions while off the monitors, none while on th monitor. She is exhausted from being unable to sleep, she is here with her .  She was evaluated by the CNM and cervix was unchanged from the office exam yesterday. Patient discharged home accompanied by her  who will be driving, she was given Atarax Prior to discharge. Discharge instructions reviewed, phone # for the clinic on discontinue instructions, PO fluids encouraged. Pt and her  deny any concerns with discontinue home.

## 2023-02-05 ENCOUNTER — ANESTHESIA EVENT (OUTPATIENT)
Dept: OBGYN | Facility: CLINIC | Age: 33
End: 2023-02-05
Payer: COMMERCIAL

## 2023-02-05 ENCOUNTER — ANESTHESIA (OUTPATIENT)
Dept: OBGYN | Facility: CLINIC | Age: 33
End: 2023-02-05
Payer: COMMERCIAL

## 2023-02-05 LAB
ERYTHROCYTE [DISTWIDTH] IN BLOOD BY AUTOMATED COUNT: 13.8 % (ref 10–15)
HCT VFR BLD AUTO: 34.8 % (ref 35–47)
HGB BLD-MCNC: 10.3 G/DL (ref 11.7–15.7)
HGB BLD-MCNC: 11.8 G/DL (ref 11.7–15.7)
MCH RBC QN AUTO: 30 PG (ref 26.5–33)
MCHC RBC AUTO-ENTMCNC: 33.9 G/DL (ref 31.5–36.5)
MCV RBC AUTO: 89 FL (ref 78–100)
PLATELET # BLD AUTO: 105 10E3/UL (ref 150–450)
RBC # BLD AUTO: 3.93 10E6/UL (ref 3.8–5.2)
SARS-COV-2 RNA RESP QL NAA+PROBE: NEGATIVE
T PALLIDUM AB SER QL: NONREACTIVE
WBC # BLD AUTO: 8.4 10E3/UL (ref 4–11)

## 2023-02-05 PROCEDURE — 258N000003 HC RX IP 258 OP 636: Performed by: ANESTHESIOLOGY

## 2023-02-05 PROCEDURE — 722N000001 HC LABOR CARE VAGINAL DELIVERY SINGLE

## 2023-02-05 PROCEDURE — 370N000003 HC ANESTHESIA WARD SERVICE

## 2023-02-05 PROCEDURE — 36415 COLL VENOUS BLD VENIPUNCTURE: CPT | Performed by: ADVANCED PRACTICE MIDWIFE

## 2023-02-05 PROCEDURE — 85018 HEMOGLOBIN: CPT | Performed by: ADVANCED PRACTICE MIDWIFE

## 2023-02-05 PROCEDURE — 120N000002 HC R&B MED SURG/OB UMMC

## 2023-02-05 PROCEDURE — 59410 OBSTETRICAL CARE: CPT | Performed by: ADVANCED PRACTICE MIDWIFE

## 2023-02-05 PROCEDURE — 250N000011 HC RX IP 250 OP 636: Performed by: ADVANCED PRACTICE MIDWIFE

## 2023-02-05 PROCEDURE — 258N000003 HC RX IP 258 OP 636: Performed by: ADVANCED PRACTICE MIDWIFE

## 2023-02-05 PROCEDURE — 250N000011 HC RX IP 250 OP 636

## 2023-02-05 PROCEDURE — 0HQ9XZZ REPAIR PERINEUM SKIN, EXTERNAL APPROACH: ICD-10-PCS | Performed by: ADVANCED PRACTICE MIDWIFE

## 2023-02-05 PROCEDURE — 250N000009 HC RX 250: Performed by: ADVANCED PRACTICE MIDWIFE

## 2023-02-05 PROCEDURE — 250N000013 HC RX MED GY IP 250 OP 250 PS 637: Performed by: ADVANCED PRACTICE MIDWIFE

## 2023-02-05 PROCEDURE — 00HU33Z INSERTION OF INFUSION DEVICE INTO SPINAL CANAL, PERCUTANEOUS APPROACH: ICD-10-PCS | Performed by: ANESTHESIOLOGY

## 2023-02-05 PROCEDURE — 250N000011 HC RX IP 250 OP 636: Performed by: ANESTHESIOLOGY

## 2023-02-05 RX ORDER — FENTANYL/ROPIVACAINE/NS/PF 2MCG/ML-.1
PLASTIC BAG, INJECTION (ML) EPIDURAL
Status: DISCONTINUED | OUTPATIENT
Start: 2023-02-05 | End: 2023-02-05

## 2023-02-05 RX ORDER — ACETAMINOPHEN 325 MG/1
650 TABLET ORAL EVERY 4 HOURS PRN
Qty: 60 TABLET | Refills: 0 | OUTPATIENT
Start: 2023-02-05 | End: 2024-08-08

## 2023-02-05 RX ORDER — CARBOPROST TROMETHAMINE 250 UG/ML
250 INJECTION, SOLUTION INTRAMUSCULAR
Status: DISCONTINUED | OUTPATIENT
Start: 2023-02-05 | End: 2023-02-07 | Stop reason: HOSPADM

## 2023-02-05 RX ORDER — NALBUPHINE HYDROCHLORIDE 10 MG/ML
2.5-5 INJECTION, SOLUTION INTRAMUSCULAR; INTRAVENOUS; SUBCUTANEOUS EVERY 6 HOURS PRN
Status: DISCONTINUED | OUTPATIENT
Start: 2023-02-05 | End: 2023-02-05

## 2023-02-05 RX ORDER — AMOXICILLIN 250 MG
1 CAPSULE ORAL 2 TIMES DAILY PRN
Status: DISCONTINUED | OUTPATIENT
Start: 2023-02-05 | End: 2023-02-07 | Stop reason: HOSPADM

## 2023-02-05 RX ORDER — DOCUSATE SODIUM 100 MG/1
100 CAPSULE, LIQUID FILLED ORAL DAILY
Status: DISCONTINUED | OUTPATIENT
Start: 2023-02-05 | End: 2023-02-07 | Stop reason: HOSPADM

## 2023-02-05 RX ORDER — ACETAMINOPHEN 325 MG/1
650 TABLET ORAL EVERY 4 HOURS PRN
Status: DISCONTINUED | OUTPATIENT
Start: 2023-02-05 | End: 2023-02-07 | Stop reason: HOSPADM

## 2023-02-05 RX ORDER — EPHEDRINE SULFATE 50 MG/ML
INJECTION, SOLUTION INTRAMUSCULAR; INTRAVENOUS; SUBCUTANEOUS
Status: DISCONTINUED
Start: 2023-02-05 | End: 2023-02-05 | Stop reason: WASHOUT

## 2023-02-05 RX ORDER — FENTANYL/ROPIVACAINE/NS/PF 2MCG/ML-.1
PLASTIC BAG, INJECTION (ML) EPIDURAL
Status: COMPLETED
Start: 2023-02-05 | End: 2023-02-05

## 2023-02-05 RX ORDER — IBUPROFEN 600 MG/1
600 TABLET, FILM COATED ORAL EVERY 6 HOURS PRN
Qty: 60 TABLET | Refills: 0 | OUTPATIENT
Start: 2023-02-05 | End: 2024-08-08

## 2023-02-05 RX ORDER — MODIFIED LANOLIN
OINTMENT (GRAM) TOPICAL
Status: DISCONTINUED | OUTPATIENT
Start: 2023-02-05 | End: 2023-02-07 | Stop reason: HOSPADM

## 2023-02-05 RX ORDER — MISOPROSTOL 200 UG/1
800 TABLET ORAL
Status: DISCONTINUED | OUTPATIENT
Start: 2023-02-05 | End: 2023-02-07 | Stop reason: HOSPADM

## 2023-02-05 RX ORDER — FENTANYL CITRATE-0.9 % NACL/PF 10 MCG/ML
100 PLASTIC BAG, INJECTION (ML) INTRAVENOUS EVERY 5 MIN PRN
Status: DISCONTINUED | OUTPATIENT
Start: 2023-02-05 | End: 2023-02-05

## 2023-02-05 RX ORDER — OXYTOCIN/0.9 % SODIUM CHLORIDE 30/500 ML
340 PLASTIC BAG, INJECTION (ML) INTRAVENOUS CONTINUOUS PRN
Status: DISCONTINUED | OUTPATIENT
Start: 2023-02-05 | End: 2023-02-07 | Stop reason: HOSPADM

## 2023-02-05 RX ORDER — IBUPROFEN 800 MG/1
800 TABLET, FILM COATED ORAL EVERY 6 HOURS PRN
Status: DISCONTINUED | OUTPATIENT
Start: 2023-02-05 | End: 2023-02-07 | Stop reason: HOSPADM

## 2023-02-05 RX ORDER — SODIUM CHLORIDE, SODIUM LACTATE, POTASSIUM CHLORIDE, CALCIUM CHLORIDE 600; 310; 30; 20 MG/100ML; MG/100ML; MG/100ML; MG/100ML
INJECTION, SOLUTION INTRAVENOUS CONTINUOUS
Status: DISCONTINUED | OUTPATIENT
Start: 2023-02-05 | End: 2023-02-05

## 2023-02-05 RX ORDER — METHYLERGONOVINE MALEATE 0.2 MG/ML
200 INJECTION INTRAVENOUS
Status: DISCONTINUED | OUTPATIENT
Start: 2023-02-05 | End: 2023-02-07 | Stop reason: HOSPADM

## 2023-02-05 RX ORDER — TRANEXAMIC ACID 10 MG/ML
1 INJECTION, SOLUTION INTRAVENOUS EVERY 30 MIN PRN
Status: DISCONTINUED | OUTPATIENT
Start: 2023-02-05 | End: 2023-02-07 | Stop reason: HOSPADM

## 2023-02-05 RX ORDER — MISOPROSTOL 200 UG/1
400 TABLET ORAL
Status: DISCONTINUED | OUTPATIENT
Start: 2023-02-05 | End: 2023-02-07 | Stop reason: HOSPADM

## 2023-02-05 RX ORDER — AMOXICILLIN 250 MG
1 CAPSULE ORAL DAILY
Qty: 90 TABLET | Refills: 1 | OUTPATIENT
Start: 2023-02-05 | End: 2024-08-08

## 2023-02-05 RX ORDER — OXYTOCIN 10 [USP'U]/ML
10 INJECTION, SOLUTION INTRAMUSCULAR; INTRAVENOUS
Status: DISCONTINUED | OUTPATIENT
Start: 2023-02-05 | End: 2023-02-07 | Stop reason: HOSPADM

## 2023-02-05 RX ORDER — HYDROCORTISONE 25 MG/G
CREAM TOPICAL 3 TIMES DAILY PRN
Status: DISCONTINUED | OUTPATIENT
Start: 2023-02-05 | End: 2023-02-07 | Stop reason: HOSPADM

## 2023-02-05 RX ORDER — BISACODYL 10 MG
10 SUPPOSITORY, RECTAL RECTAL DAILY PRN
Status: DISCONTINUED | OUTPATIENT
Start: 2023-02-05 | End: 2023-02-07 | Stop reason: HOSPADM

## 2023-02-05 RX ADMIN — ACETAMINOPHEN 650 MG: 325 TABLET, FILM COATED ORAL at 04:25

## 2023-02-05 RX ADMIN — IBUPROFEN 800 MG: 800 TABLET, FILM COATED ORAL at 19:02

## 2023-02-05 RX ADMIN — METOCLOPRAMIDE HYDROCHLORIDE 10 MG: 5 INJECTION INTRAMUSCULAR; INTRAVENOUS at 00:13

## 2023-02-05 RX ADMIN — ACETAMINOPHEN 650 MG: 325 TABLET, FILM COATED ORAL at 23:35

## 2023-02-05 RX ADMIN — ACETAMINOPHEN 650 MG: 325 TABLET, FILM COATED ORAL at 19:01

## 2023-02-05 RX ADMIN — Medication: at 00:46

## 2023-02-05 RX ADMIN — SENNOSIDES AND DOCUSATE SODIUM 1 TABLET: 50; 8.6 TABLET ORAL at 14:52

## 2023-02-05 RX ADMIN — BUPIVACAINE HYDROCHLORIDE 10 ML: 2.5 INJECTION, SOLUTION EPIDURAL; INFILTRATION; INTRACAUDAL at 00:44

## 2023-02-05 RX ADMIN — IBUPROFEN 800 MG: 800 TABLET, FILM COATED ORAL at 12:57

## 2023-02-05 RX ADMIN — SODIUM CHLORIDE, POTASSIUM CHLORIDE, SODIUM LACTATE AND CALCIUM CHLORIDE: 600; 310; 30; 20 INJECTION, SOLUTION INTRAVENOUS at 00:54

## 2023-02-05 RX ADMIN — LIDOCAINE HYDROCHLORIDE 20 ML: 10 INJECTION, SOLUTION EPIDURAL; INFILTRATION; INTRACAUDAL; PERINEURAL at 01:56

## 2023-02-05 RX ADMIN — ACETAMINOPHEN 650 MG: 325 TABLET, FILM COATED ORAL at 07:52

## 2023-02-05 RX ADMIN — ACETAMINOPHEN 650 MG: 325 TABLET, FILM COATED ORAL at 14:52

## 2023-02-05 RX ADMIN — Medication 340 ML/HR: at 01:47

## 2023-02-05 RX ADMIN — IBUPROFEN 800 MG: 800 TABLET, FILM COATED ORAL at 07:09

## 2023-02-05 ASSESSMENT — ACTIVITIES OF DAILY LIVING (ADL)
ADLS_ACUITY_SCORE: 18
ADLS_ACUITY_SCORE: 22
ADLS_ACUITY_SCORE: 22
ADLS_ACUITY_SCORE: 18
ADLS_ACUITY_SCORE: 18
ADLS_ACUITY_SCORE: 22
ADLS_ACUITY_SCORE: 18
ADLS_ACUITY_SCORE: 18
ADLS_ACUITY_SCORE: 22
ADLS_ACUITY_SCORE: 22

## 2023-02-05 NOTE — H&P
[facility] Labor and Delivery History and Physical    Dariana Castellon MRN# [id number]   Age: 33 year old YOB: 1990     Date of Admission:  2023    Primary care provider: Brody Kirk           Chief Complaint:   Dariana Castellon is a 33 year old female  who is 39w6d pregnant and being admitted for early labor.   Was evaluated in triage this morning and was 2cm, discharged with vistaril for rest. Had 2 hours of sleep, then contractions became closer together and stronger throughout the day becoming strong at 4pm, now every 4-6 minutes. Had some mucous/bloody show, no leakage of fluid. Denies vision changes, headache. Baby active. Here with her , Alfredito, and her brother.           Pregnancy history:   Prenatal records reviewed.   Initiation of care at 8 weeks with 4 visits in [place], 7 with [facility], 11 total visits.   Ht 63 in. BMI 23   TW - 155 = 16#    OBSTETRIC HISTORY:    OB History    Para Term  AB Living   3 2 2 0 0 2   SAB IAB Ectopic Multiple Live Births   0 0 0 0 2      # Outcome Date GA Lbr Ang/2nd Weight Sex Delivery Anes PTL Lv   3 Current            2 Term 21 40w5d / 00:13 2.995 kg (6 lb 9.6 oz) M Vag-Spont EPI N JAN      Name: ZACH CASTELLON      Apgar1: 9  Apgar5: 9   1 Term 18 39w1d  2.863 kg (6 lb 5 oz) F Vag-Vacuum EPI  JAN      Complications: Disorder involving thrombocytopenia (H)      Name: [facility]      Obstetric Comments   Breastfeed- 1+ year       EDC: Estimated Date of Delivery: 23    Prenatal Labs:   Lab Results   Component Value Date    HGB 11.7 2023       GBS Status:   Positive    Active Problem List  Patient Active Problem List   Diagnosis     High-risk pregnancy, third trimester     GBS bacteriuria     Hx of maternal laceration, 3rd degree, currently pregnant     Abnormal thyroid blood test     Positive HSV 1 IgG     High blood hemoglobin F (H)     Gestational thrombocytopenia (H)      Labor and delivery, indication for care       Medication Prior to Admission  Medications Prior to Admission   Medication Sig Dispense Refill Last Dose     Ascorbic Acid (VITAMIN C) 500 MG CAPS         Ferrous Gluconate 239 (27 Fe) MG TABS Take 1 capsule by mouth        hydrOXYzine (ATARAX) 25 MG tablet Take 1 tablet (25 mg) by mouth every 8 hours as needed for itching 10 tablet 0      Prenatal Vit-Fe Fumarate-FA (PNV PRENATAL PLUS MULTIVITAMIN) 27-1 MG TABS per tablet Take 1 tablet by mouth daily      .                      Family History:   No family history on file.           Social History:   , homemaker  Social History     Tobacco Use     Smoking status: Never     Smokeless tobacco: Never   Vaping Use     Vaping Use: Never used   Substance Use Topics     Alcohol use: Never     Drug use: Never            Review of Systems:   The Review of Systems is negative other than noted in the HPI          Physical Exam:   Vitals were reviewed  /61 (BP Location: Left arm, Patient Position: Semi-Rios's, Cuff Size: Adult Regular)   Pulse 95   Temp 98.9  F (37.2  C) (Oral)   Resp 20   LMP 05/02/2022     Constitutional:   awake, alert, working/breathing with contractions but no distress     Lungs:   No increased work of breathing, good air exchange, clear to auscultation bilaterally, no crackles or wheezing     Cardiovascular:   Normal apical impulse, regular rate and rhythm, normal S1 and S2, no S3 or S4, and no murmur noted     Abdomen:   No scars. Gravid, cephalic by leopolds confirmed by BSUS      Ext: DTRs wnl.   Cervix:   Membranes: intact   Dilation: 4   Effacement: 90%   Station:-2   Consistency: soft   Position: Mid  Presentation:Cephalic  Fetal Heart Rate: baseline 145 with moderate variability. Accelerations present. No decelerations.   Tocometer: contractions every 4-5 minutes, lasting 60-90 seconds. Moderate to palpation.                        Assessment:   Dariana Castellon is a 39w6d pregnant  female  admitted with early labor   Fetal Heart Rate Tracing: category 1     Patient Active Problem List   Diagnosis     High-risk pregnancy, third trimester     GBS bacteriuria     Hx of maternal laceration, 3rd degree, currently pregnant     Abnormal thyroid blood test     Positive HSV 1 IgG     High blood hemoglobin F (H)     Gestational thrombocytopenia (H)     Labor and delivery, indication for care           Plan:   Admit - see IP orders  Pain medication - desires epidural. Platelet yesterday 112.  Prophylactic antibiotic for + GBS status  MD consultant on call/ available prn  Anticipate     KERRY Epstein CNM

## 2023-02-05 NOTE — ANESTHESIA PROCEDURE NOTES
"Epidural catheter Procedure Note    Pre-Procedure   Staff -        Anesthesiologist:  Kassandra Odell MD       Resident/Fellow: Darian Larson MD       Performed By: resident       Location: OB       Pre-Anesthestic Checklist: patient identified, IV checked, risks and benefits discussed, informed consent, monitors and equipment checked, pre-op evaluation, at physician/surgeon's request and post-op pain management  Timeout:       Correct Patient: Yes        Correct Procedure: Yes        Correct Site: Yes        Correct Position: Yes   Procedure Documentation  Procedure: epidural catheter       Patient Position: sitting       Patient Prep/Sterile Barriers: sterile gloves, mask, patient draped       Skin prep: Chloraprep       Local skin infiltrated with 3 mL of 2% lidocaine.        Insertion Site: L4-5. (midline approach).       Technique: LORT saline        REFUGIO at 7 cm.       Needle Type: popAD needle       Needle Gauge: 17.        Needle Length (Inches): 3.5        Catheter: 19 G.          Catheter threaded easily.         5 cm epidural space.         Threaded 12 cm at skin.         # of attempts: 1 and  # of redirects:  1    Assessment/Narrative         Paresthesias: No.       Test dose of 3 mL lidocaine 1.5% w/ 1:200,000 epinephrine at 12:36 CST.         Test dose negative, 3 minutes after injection, for signs of intravascular, subdural, or intrathecal injection.       Insertion/Infusion Method: LORT saline       Aspiration negative for Heme or CSF via Epidural Catheter.    Medication(s) Administered   0.125% bupivacaine 5 mL + fentanyl 20 mcg + NS 5 mL (Epidural) (Mixture components: bupivacaine HCl (PF) 0.25 % Soln, 5 mL; fentaNYL (PF) 100 MCG/2ML Soln, 20 mcg; sodium chloride 0.9 % Soln, 5 mL) - EPIDURAL   10 mL - 2/5/2023 12:44:00 AM    FOR [facility] ([facility]) ONLY:   Pain Team Contact information: please page the Pain Team Via Bit9. Search \"Pain\". During daytime hours, please page the attending " first. At night please page the resident first.

## 2023-02-05 NOTE — L&D DELIVERY NOTE
OB Vaginal Delivery Note    Dariana Castellon MRN# [id number]   Age: 33 year old YOB: 1990       GA: 39w6d  GP:   Labor Complications: None   Delivery QBL:  pending  Delivery Type: Vaginal, Spontaneous   ROM to Delivery Time: (Delivered) Minutes: 36   Weight:     1 Minute 5 Minute 10 Minute   Apgar Totals: 9   9        ELIZABETH CHUNG;TERRY SEBASTIAN;VITO REYNOSO;LITA ARNOLD     Delivery Details:  Dariana Castellon, a 33 year old now  female delivered a viable infant with apgars of 9  and 9 . Patient was fully dilated and pushing after 9  hours 30  minutes in labor. Delivery was via vaginal, spontaneous  vaginal birth under epidural  anesthesia. Infant delivered in vertex  right  occiput  anterior  position. Anterior and posterior shoulders delivered without difficulty. After cessation of cord pulsation, about one minute, the cord was clamped and cut. 3 vessels  were noted. Cord blood was obtained in routine fashion with the following disposition: lab .  Cord complications: nuchal, easily reduced. Placenta delivered at 2023  1:52 AM . Placental disposition was pending - Dariana is deciding. Fundal massage performed and fundus found to be firm.   Episiotomy: none    Perineum, vagina, cervix were inspected, and the following lacerations were noted:   Perineal lacerations: 1st  degree was repaired with 1% lidocaine for anesthesia and 3-0 vicryl.   Excellent hemostasis was noted. Needle count correct. Infant and patient in delivery room in good and stable condition. Yania breastfeeding.   KERRY Epstein CNM         Cande, Female-Raalesha [[id number]]    Labor Event Times    Labor onset date: 23 Onset time:  4:00 PM   Dilation complete date: 23 Complete time:  1:30 AM   Start pushing date/time: 2023 0148      Labor Length    1st Stage (hrs): 9 (min): 30   2nd Stage (hrs): 0 (min): 16   3rd Stage (hrs): 0 (min): 5      Labor Events     labor?:  No   steroids: None  Labor Type: Spontaneous  Predominate monitoring during 1st stage: continuous electronic fetal monitoring     Antibiotics received during labor?: Yes  Reason for Antibiotics: GBS  Antibiotics received for GBS: Penicillin  Antibiotics Given (GBS): Less than or equal to 4 hours prior to delivery     Rupture date/time: 23 0110   Rupture type: Spontaneous rupture of membranes occuring during spontaneous labor or augmentation  Fluid color: Clear  Fluid odor: Normal     Augmentation: None  1:1 continuous labor support provided by?: none Labor partogram used?: no      Delivery/Placenta Date and Time    Delivery Date: 23 Delivery Time:  1:46 AM   Placenta Date/Time: 2023  1:52 AM  Oxytocin given at the time of delivery: after delivery of baby  Delivering clinician: Vaishnavi Costa APRN CNM   Other personnel present at delivery:  Provider Role   Radha Candelario, RUFUS Pritchard, Terry HOFF, Lindsey Glover RN McPhee, Cheryle AMIN RN          Vaginal Counts     Initial count performed by 2 team members:  Two Team Members   Radha Riggins CNM       Barre Suture Needles Sponges (RETIRED) Instruments   Initial counts 2  5    Added to count  1 5    Relief counts       Final counts 3 1 10          Placed during labor Accounted for at the end of labor   FSE No NA   IUPC No NA   Cervidil No NA              Final count performed by 2 team members:  Two Team Members   Radha Riggins CNM      Final count correct?: Yes     Apgars    Living status: Living   1 Minute 5 Minute 10 Minute 15 Minute 20 Minute   Skin color: 1  1       Heart rate: 2  2       Reflex irritability: 2  2       Muscle tone: 2  2       Respiratory effort: 2  2       Total: 9  9       Apgars assigned by: TERRY MACEDO RN     Cord    Vessels: 3 Vessels    Cord Complications: Nuchal   Nuchal Intervention: reduced               Cord Blood Disposition: Lab    Gases Sent?: No    Delayed cord clamping?:  Yes    Cord Clamping Delay (seconds):  seconds    Stem cell collection?: No        Resuscitation    Methods: None   Care at Delivery: Data: female baby born at 0146. Delivery remarkable for GBS+ without adequate treatment.  Action: Interventions at birth were drying and warm blankets. Infant placed skin-to-skin with mother.  Response: Stable . Positive bonding behaviors observed.      Output in Delivery Room: Stool     [place]ite Measurements    Output in delivery room: Stool     Skin to Skin and Feeding Plan    Skin to skin initiation date/time: 1841    Skin to skin with: Mother  Skin to skin end date/time:     Breastfeeding initiated date/time: 2023 0205     Labor Events and Shoulder Dystocia    Fetal Tracing Prior to Delivery: Category 1  Shoulder dystocia present?: Neg     Delivery (Maternal) (Provider to Complete) ([id number])    Episiotomy: None  Perineal lacerations: 1st Repaired?: Yes   Repair suture: 3-0 Vicryl  Number of repair packets: 1  Genital tract inspection done: Pos     Blood Loss  Mother: Dariana Castellon N #[id number]   Start of Mother's Information    Delivery Blood Loss  23 1600 - 23 0237    None           End of Mother's Information  Mother: Dariana Castellon N #[id number]          Delivery - Provider to Complete ([id number])    Delivering clinician: Vaishnavi Costa APRN CNM  CNM Care: Any CNM care in labor, Exclusive CNM care in labor  Attempted Delivery Types (Choose all that apply): Spontaneous Vaginal Delivery  Delivery Type (Choose the 1 that will go to the Birth History): Vaginal, Spontaneous                   Other personnel:  Provider Role   Radha Candelario, Jake Valdovinos RN    Lindsey Rangel RN McPhee, Mackenzie R, RN                 Placenta    Date/Time: 2023  1:52 AM  Removal: Spontaneous  Comments: jacquelyn Robert, 3vc           Anesthesia    Method: Epidural  Cervical dilation at placement: 4-7                 Presentation and Position    Presentation: Vertex    Position: Right Occiput Anterior                 KERRY Epstein CNM

## 2023-02-05 NOTE — PLAN OF CARE
Goal Outcome Evaluation:    [id number]   So far, the patient has had no problems. Vitals and wdl assessment. Diet tolerance. Patient needs a lot of reassurance about self-care. Will continue with current plan of care

## 2023-02-05 NOTE — ANESTHESIA PREPROCEDURE EVALUATION
Anesthesia Pre-Procedure Evaluation    Patient: Dariana Castellon   MRN: [id number] : 1990        Procedure :           No past medical history on file.   No past surgical history on file.   No Known Allergies   Social History     Tobacco Use     Smoking status: Never     Smokeless tobacco: Never   Substance Use Topics     Alcohol use: Never      Wt Readings from Last 1 Encounters:   23 69.4 kg (153 lb)        Anesthesia Evaluation   Pt has had prior anesthetic. Type: OB Labor Epidural.    No history of anesthetic complications       ROS/MED HX  ENT/Pulmonary:  - neg pulmonary ROS     Neurologic:  - neg neurologic ROS     Cardiovascular:  - neg cardiovascular ROS     METS/Exercise Tolerance:     Hematologic:  - neg hematologic  ROS     Musculoskeletal:       GI/Hepatic:  - neg GI/hepatic ROS     Renal/Genitourinary:       Endo:  - neg endo ROS     Psychiatric/Substance Use:  - neg psychiatric ROS     Infectious Disease:       Malignancy:       Other:            Physical Exam    Airway        Mallampati: I   TM distance: > 3 FB   Neck ROM: full   Mouth opening: > 3 cm    Respiratory Devices and Support         Dental  no notable dental history         Cardiovascular   cardiovascular exam normal          Pulmonary   pulmonary exam normal                OUTSIDE LABS:  CBC:   Lab Results   Component Value Date    WBC 8.0 2023    WBC 6.9 2023    HGB 11.7 2023    HGB 11.7 2023    HCT 35.1 2023    HCT 34.6 (L) 2023     (L) 2023     (L) 2023     BMP: No results found for: NA, POTASSIUM, CHLORIDE, CO2, BUN, CR, GLC  COAGS: No results found for: PTT, INR, FIBR  POC: No results found for: BGM, HCG, HCGS  HEPATIC: No results found for: ALBUMIN, PROTTOTAL, ALT, AST, GGT, ALKPHOS, BILITOTAL, BILIDIRECT, GRADY  OTHER:   Lab Results   Component Value Date    TSH 1.32 2022       Anesthesia Plan    ASA Status:  2      Anesthesia Type: Epidural.               Consents    Anesthesia Plan(s) and associated risks, benefits, and realistic alternatives discussed. Questions answered and patient/representative(s) expressed understanding.    - Discussed:     - Discussed with:  Patient         Postoperative Care            Comments:           neg OB ROS.       Darian Larson MD

## 2023-02-05 NOTE — PROGRESS NOTES
Data: Patient presented to [facility] at 2242.   Reason for maternal/fetal assessment per patient is Laboring  .  Patient is a . Prenatal record reviewed.      OB History    Para Term  AB Living   3 2 2 0 0 2   SAB IAB Ectopic Multiple Live Births   0 0 0 0 2      # Outcome Date GA Lbr Ang/2nd Weight Sex Delivery Anes PTL Lv   3 Current            2 Term 21 40w5d / 00:13 2.995 kg (6 lb 9.6 oz) M Vag-Spont EPI N JAN      Name: ZACH BENAVIDEZ      Apgar1: 9  Apgar5: 9   1 Term 18 39w1d  2.863 kg (6 lb 5 oz) F Vag-Vacuum EPI  JAN      Complications: Disorder involving thrombocytopenia (H)      Name: Celine      Obstetric Comments   Breastfeed- 1+ year   . Medical history: No past medical history on file.. Gestational Age 39w5d. VSS. Fetal movement present. Patient denies  vaginal discharge, pelvic pressure, UTI symptoms, GI problems, bloody show, vaginal bleeding, edema, headache, visual disturbances, epigastric or URQ pain, abdominal pain, rupture of membranes. Support persons are present.  Action: Verbal consent for EFM. Triage assessment completed. EFM and TOCO applied for fetal and uterine assessment.  Response: RODRIGO Costa CNM informed of arrival.

## 2023-02-05 NOTE — PROGRESS NOTES
"Data: Dariana Castellon transferred to 72 via wheelchair at 0405. Baby transferred via parent's arms in wheelchair.  Pt was unable to void postpartum, but declined a bladder scanner or straight cath as she feels that the urine is \"right there\". She'd like to try again in 1 hour. Fundus is firm, no bleeding.   Action: Receiving unit notified of transfer: Yes. Patient and family notified of room change. Report given to Mary Lou at 0405. Belongings sent to receiving unit. Accompanied by Registered Nurse. Oriented patient to surroundings. Call light within reach. ID bands double-checked with receiving RN.  Response: Patient tolerated transfer and is stable.  "

## 2023-02-05 NOTE — PLAN OF CARE
Patient arrived to [facility] unit via wheelchair, with belongings, accompanied by spouse, with infant in mom's arms. Got report from RUFUS Garcia, and checked bands. Unit and room orientation done in RM 7170*. No concerns at this time. Continue with plan of care.      Add: placenta is in patient's room in an ice, pt have not signed the release form yet as she still deciding to bring it home or not. Day RN informed.

## 2023-02-05 NOTE — PROGRESS NOTES
Dariana Castellon      MRN#: [id number]  Age: 33 year old      YOB: 1990      PPD 1 S/P   Dariana Castellon is please with her birth experience. She reports it was intense but much easier once she got an epidural. Dariana is wearing a napkin in her mask because she currently has a head cold. (She report she got it from her children). They typically treat colds with warm beverages, humidification.    She is up and active in the room independently, is voiding and ambulating without difficulty without dizziness or calf pain. Tolerating normal diet and passing flatus. has not had BM. Voiding without issue. Bleeding is light without clots. Perineal pain is is minimal.  The perineum laceration is well approximated.  Baby girl is feeding on cue, Dariana is also giving 10cc of formula after breastfeeding. She typically primarily breastfeeds, but then will offer formula as well. Breastfeeds for 1+ year typically   Postpartum contraception plans include non hormonal method, Paragard. Carol are planning to take a long break before having more children.   Support at home identified Carol just moved here from [place] to be closer to family. They report they have a lot of family support. Alfredito is in training for his new job, but thinks he should be able to take 2 or so weeks off to support Dariana.   Neither report a history of depression or anxiety         SIGNIFICANT PROBLEMS:  Patient Active Problem List    Diagnosis Date Noted     Labor and delivery, indication for care 2023     Priority: Medium     Gestational thrombocytopenia (H) 2023     Priority: Medium     22:   23: recommended weekly platelets, repeat 128       High-risk pregnancy, third trimester 2022     Priority: Medium     ALBINA at 33 weeks  [facility] CNM pt  Partner's name: Alfredito  [x] NOB folder  [x ] Dating  [ ] First tri screen ordered; declined  [ ] QS/AFP ordered declined  [ ] Fetal anatomy US  ordered  [x] Rubella immune  [ ] Hep B immune/nonimmune  [ ] Pap  [NA] Started ASA   [x] NO plan utox in labor   [x] COVID vaccine completed  _____________________________________  [x] EOB folder  [x ] PP Contraception plan: undecided, maybe paragard.   [x] Labor plans: epidural.  and sister.   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 Elevated fetal hemoglobin in adults and children greater than 12 months old may be seen in many conditions, the most common being pregnancy, hereditary persistence of hemoglobin F, and thalassemia.           PE:    Vitals:    02/05/23 0253 02/05/23 0308 02/05/23 0419 02/05/23 0730   BP: 94/62 95/59 97/88 113/71   BP Location:   Left arm    Patient Position:   Semi-Rios's Semi-Rios's   Cuff Size:   Adult Regular Adult Large   Pulse:   82 72   Resp:   16 16   Temp:   98.1  F (36.7  C) 97.8  F (36.6  C)   TempSrc:   Oral Oral   SpO2:           NAD  Caridac- Regular rate and rhythm  Respiratory: non-labored breathing pattern  Breasts: Soft, filling  Nipples: Intact, Non-tender  Abdomen: Soft, Non-tender      Uterus: Fundus Firm, Non-tender, located 1 below the umbilicus   Lochia: Rubra, appropriate amount    Perineum:  Well-approximated, healing well  Lower Extremities: No Edema  Bilateral, Negative Fredy's Sign      Postpartum hemoglobin    Recent Labs   Lab 23  0820 23  2344 23  1531   HGB 10.3* 11.8 11.7     Blood type No results found for: ABO  No results found for: RH  Rubella status - immune      Assessment/Plan-   32 yo  Day 1 postpartum,    s/p 1st degree laceration repaired  Complications:none, does currently have a URI  Breastfeeding established, also giving some formula  No history of depression/anxiety  PP Contraceptive Plans: Non hormonal, considering Paragard  Stable Post-partum day #1  Rhophylac Not indicated  GBS positive, inadequate treatment    Teaching done: D/C Instructions: Nutrition/Activity, Birth Control Options and RTC Clinic for PP Appointment    Postpartum warning s/s reviewed, including bleeding/clots, fever, mastitis, or depression    Current Discharge Medication List      CONTINUE these medications which have NOT CHANGED    Details   Ascorbic Acid (VITAMIN C) 500 MG CAPS       Ferrous Gluconate 239 (27 Fe) MG TABS Take 1 capsule by mouth      hydrOXYzine (ATARAX) 25 MG tablet Take 1 tablet (25 mg) by mouth every 8 hours as needed for itching  Qty: 10 tablet, Refills: 0    Associated Diagnoses: High-risk pregnancy, third trimester; Pregnancy related fatigue in third trimester      Prenatal Vit-Fe Fumarate-FA (PNV PRENATAL PLUS MULTIVITAMIN) 27-1 MG TABS per tablet Take 1 tablet by mouth daily           -Reviewed strategies to support her cold: hydration, humidification, saline rinses, netie pot for Rahma and Dorota nose syringe for kids  -Discussed GBS inadequately treated, will need to stay 36-48 per peds  -Plan postpartum discharge 23  -Desires Medela breast pump, informed RN  -Reviewed recommendation to follow up in the clinic at 2 and 6 weeks.   Routine Postpartum Management  Encouraged Postpartum videos before discharge  Anticipate discharge to home 23  RTC 2 weeks (virtual) and 6 weeks pp    Pili Vasquez,  CNM

## 2023-02-06 LAB
ERYTHROCYTE [DISTWIDTH] IN BLOOD BY AUTOMATED COUNT: 14.2 % (ref 10–15)
HCT VFR BLD AUTO: 34.6 % (ref 35–47)
HGB BLD-MCNC: 11.2 G/DL (ref 11.7–15.7)
MCH RBC QN AUTO: 29.9 PG (ref 26.5–33)
MCHC RBC AUTO-ENTMCNC: 32.4 G/DL (ref 31.5–36.5)
MCV RBC AUTO: 92 FL (ref 78–100)
PLATELET # BLD AUTO: 129 10E3/UL (ref 150–450)
RBC # BLD AUTO: 3.75 10E6/UL (ref 3.8–5.2)
WBC # BLD AUTO: 6.8 10E3/UL (ref 4–11)

## 2023-02-06 PROCEDURE — 250N000013 HC RX MED GY IP 250 OP 250 PS 637: Performed by: ADVANCED PRACTICE MIDWIFE

## 2023-02-06 PROCEDURE — 85014 HEMATOCRIT: CPT | Performed by: ADVANCED PRACTICE MIDWIFE

## 2023-02-06 PROCEDURE — 36415 COLL VENOUS BLD VENIPUNCTURE: CPT | Performed by: ADVANCED PRACTICE MIDWIFE

## 2023-02-06 PROCEDURE — 120N000002 HC R&B MED SURG/OB UMMC

## 2023-02-06 RX ORDER — MULTIVIT WITH MINERALS/LUTEIN
250 TABLET ORAL DAILY
Qty: 90 TABLET | Refills: 0 | Status: SHIPPED | OUTPATIENT
Start: 2023-02-06 | End: 2024-05-03

## 2023-02-06 RX ORDER — BENZOCAINE AND MENTHOL, UNSPECIFIED FORM 15; 2.3 MG/1; MG/1
1 LOZENGE ORAL 3 TIMES DAILY PRN
Qty: 16 LOZENGE | Refills: 0 | Status: SHIPPED | OUTPATIENT
Start: 2023-02-06 | End: 2024-07-16

## 2023-02-06 RX ORDER — FERROUS SULFATE 325(65) MG
325 TABLET ORAL
Qty: 90 TABLET | Refills: 0 | Status: SHIPPED | OUTPATIENT
Start: 2023-02-06 | End: 2023-03-31

## 2023-02-06 RX ORDER — LANOLIN ALCOHOL/MO/W.PET/CERES
1000 CREAM (GRAM) TOPICAL DAILY
Qty: 90 TABLET | Refills: 0 | Status: SHIPPED | OUTPATIENT
Start: 2023-02-06 | End: 2023-03-31

## 2023-02-06 RX ORDER — GUAIFENESIN 600 MG/1
600 TABLET, EXTENDED RELEASE ORAL 2 TIMES DAILY PRN
Status: DISCONTINUED | OUTPATIENT
Start: 2023-02-06 | End: 2023-02-07 | Stop reason: HOSPADM

## 2023-02-06 RX ORDER — AMOXICILLIN 250 MG
1 CAPSULE ORAL DAILY
Qty: 100 TABLET | Refills: 0 | Status: SHIPPED | OUTPATIENT
Start: 2023-02-06 | End: 2023-03-31

## 2023-02-06 RX ORDER — GUAIFENESIN 600 MG/1
1200 TABLET, EXTENDED RELEASE ORAL 2 TIMES DAILY PRN
Qty: 20 TABLET | Refills: 0 | Status: SHIPPED | OUTPATIENT
Start: 2023-02-06 | End: 2023-03-31

## 2023-02-06 RX ORDER — IBUPROFEN 600 MG/1
600 TABLET, FILM COATED ORAL EVERY 6 HOURS PRN
Qty: 60 TABLET | Refills: 0 | Status: SHIPPED | OUTPATIENT
Start: 2023-02-06 | End: 2023-03-31

## 2023-02-06 RX ADMIN — ACETAMINOPHEN 650 MG: 325 TABLET, FILM COATED ORAL at 15:50

## 2023-02-06 RX ADMIN — Medication 1 LOZENGE: at 15:50

## 2023-02-06 RX ADMIN — IBUPROFEN 800 MG: 800 TABLET, FILM COATED ORAL at 19:49

## 2023-02-06 RX ADMIN — IBUPROFEN 800 MG: 800 TABLET, FILM COATED ORAL at 11:07

## 2023-02-06 RX ADMIN — ACETAMINOPHEN 650 MG: 325 TABLET, FILM COATED ORAL at 11:21

## 2023-02-06 RX ADMIN — ACETAMINOPHEN 650 MG: 325 TABLET, FILM COATED ORAL at 04:11

## 2023-02-06 RX ADMIN — IBUPROFEN 800 MG: 800 TABLET, FILM COATED ORAL at 04:12

## 2023-02-06 RX ADMIN — DOCUSATE SODIUM 100 MG: 100 CAPSULE, LIQUID FILLED ORAL at 11:21

## 2023-02-06 ASSESSMENT — ACTIVITIES OF DAILY LIVING (ADL)
ADLS_ACUITY_SCORE: 18

## 2023-02-06 NOTE — PLAN OF CARE
VSS. Postpartum assessment WNL. Pt complaining of cramping discomfort. PO pain meds and hot packs given. Breastfeeding and formula feeding per request. Encouraged mother to always put infant to breast before offering formula. Encouraged pt to complete paperwork. Continue plan of care.

## 2023-02-06 NOTE — PLAN OF CARE
VSS and postpartum assessments WNL. Pain adequately managed with tylenol and ibuprofen. Breastfeeding independently with good latch, complains of cramping while breastfeeding. Supplementing with formula after feeds. Bonding well with  and responsive to cues.

## 2023-02-06 NOTE — PROGRESS NOTES
Dariana Castellon      MRN#: [id number]  Age: 33 year old      YOB: 1990      PPD #1 S/P   Patient feels well and is without issue, baby is rooming in  Breast feeding status:initiated, latching well. Supplementing post breastfeed with formula per patient preference.  Complications since 2 hours post delivery: Noted mild headache and ringing in her ears today, anesthesia notified to assess for spinal headache; Hgb 10.3, Felipe will take iron, vit c and vit B12 at discharge  Patient is tolerating regular diet,  tolerating acitivity well, voiding without difficulty, cramping is relieved by Ibuprophen and hot pack, lochia is decreasing, denies clots.  Perineal pain is is minimal.  The perineum laceration is well approximated.   Has had BM.     Felipe is still experiencing URI symptoms of mild cough and nasal congestion, discussed options for medications and possible decreased milk supply with decongestants. Requested Mucinex and throat lozenges to help with this; orders placed.      SIGNIFICANT PROBLEMS:  Patient Active Problem List    Diagnosis Date Noted     First degree perineal laceration 2023     Priority: Medium     Labor and delivery, indication for care 2023     Priority: Medium     Gestational thrombocytopenia (H) 2023     Priority: Medium     22:   23: recommended weekly platelets, repeat 128       High-risk pregnancy, third trimester 2022     Priority: Medium     ALBINA at 33 weeks  [facility] CNM pt  Partner's name: Alfredito  [x] NOB folder  [x ] Dating  [ ] First tri screen ordered; declined  [ ] QS/AFP ordered declined  [ ] Fetal anatomy US ordered  [x] Rubella immune  [ ] Hep B immune/nonimmune  [ ] Pap  [NA] Started ASA   [x] NO plan utox in labor   [x] COVID vaccine completed  _____________________________________  [x] EOB folder  [x ] PP Contraception plan: undecided, maybe paragard.   [x] Labor plans: epidural.  and sister.   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 Elevated fetal hemoglobin in adults and children greater than 12 months old may be seen in many conditions, the most common being pregnancy, hereditary persistence of hemoglobin F, and thalassemia.           PE:    Vitals:    02/05/23 2000 02/06/23 0100 02/06/23 0412 02/06/23 1121   BP: 108/71 97/56 105/67 118/72   BP Location: Left arm Left arm Left arm Left arm   Patient Position: Semi-Rios's Semi-Rios's Semi-Rios's Semi-Rios's   Cuff Size: Adult Regular Adult Regular Adult Regular Adult Regular   Pulse: 88 69 79 78   Resp: 18 14  16   Temp: 98.9  F (37.2  C) 98.2  F (36.8  C) 98  F (36.7  C) 98.6  F (37  C)   TempSrc: Oral Oral Oral Oral   SpO2:           NAD  Caridac- well perfused  Lungs- unlabored breathing  Breasts: Soft, filling  Nipples: Intact, Non-tender  Abdomen: Soft, Non-tender      Uterus: Fundus Firm, Non-tender, located 1 below the umbilicus   Lochia: Rubra, appropriate amount    Perineum:  Well-approximated, healing well  Lower Extremities: trace Edema Bilateral, Negative Fredy's Sign    Postpartum hemoglobin    Recent Labs   Lab 02/05/23  0820 02/04/23  2344 02/03/23  1531   HGB 10.3* 11.8 11.7     Rubella status - immune      Assessment/Plan-   Stable  Post-partum day #1  Complications:anemia d/t acute blood loss and iron deficiency, plan for iron supplementation; GBS + inadequately treated  Breast feeding  Rhophylac not indicated    Teaching done: PNV and Iron supplementation, spinal headaches, safe OTC medication for URI while breastfeeding    Postpartum warning s/s reviewed, including bleeding/clots, fever, mastitis, or depression    Birthcontrol planned:IUD paragard    Motrin and stool softener prescriptions sent.    Reviewed hemoglobin of 10.3 d/t acute blood loss and iron deficiency. Recommended iron supplementation. Prescription sent for po iron, vitamin c and b12. Reviewed use.  Also recommended increasing iron rich foods.     Current Discharge Medication List      START taking these medications    Details   cyanocobalamin (VITAMIN B-12) 1000 MCG tablet Take 1 tablet (1,000 mcg) by mouth daily  Qty: 90 tablet, Refills: 0    Associated Diagnoses:  (normal spontaneous vaginal delivery); Anemia, unspecified type      ferrous sulfate (FEROSUL) 325 (65 Fe) MG tablet Take 1 tablet (325 mg) by mouth daily (with breakfast)  Qty: 90 tablet, Refills: 0    Associated Diagnoses:  (normal spontaneous vaginal delivery); Anemia, unspecified type      ibuprofen (ADVIL/MOTRIN) 600 MG tablet Take 1 tablet (600 mg) by mouth every 6 hours as needed for moderate pain (4-6) Start after delivery  Qty: 60 tablet, Refills: 0    Associated Diagnoses:  (normal spontaneous vaginal delivery)      senna-docusate (SENOKOT-S/PERICOLACE) 8.6-50 MG tablet Take 1 tablet by mouth daily Start after delivery.  Qty: 100 tablet, Refills: 0    Associated Diagnoses:  (normal spontaneous vaginal delivery)      vitamin C (ASCORBIC ACID) 250 MG tablet Take 1 tablet (250 mg) by mouth daily  Qty: 90 tablet, Refills: 0    Associated Diagnoses:  (normal spontaneous vaginal delivery); Anemia, unspecified type         CONTINUE these medications which have NOT CHANGED    Details    Prenatal Vit-Fe Fumarate-FA (PNV PRENATAL PLUS MULTIVITAMIN) 27-1 MG TABS per tablet Take 1 tablet by mouth daily         STOP taking these medications       Ascorbic Acid (VITAMIN C) 500 MG CAPS Comments:   Reason for Stopping:         Ferrous Gluconate 239 (27 Fe) MG TABS Comments:   Reason for Stopping:         hydrOXYzine (ATARAX) 25 MG tablet Comments:   Reason for Stopping:               Routine Postpartum Management  Encouraged Postpartum videos before discharge  Anticipate discharge to home tomorrow  RTC 2 week telephone call mood check and 6 week postpartum visit       I, Swapna Wren, completed the PFSH and ROS. I then acted as a scribe for CNM for the remainder of the visit. - Swapna Wren, RN SNM    I agree with the PFSH and ROS as completed by the SNM, except for changes made by me. The remainder of the encounter was performed by me and scribed by the SNM. The scribed note accurately reflects my personal services and decisions made by me.    The encounter was performed by me and scribed by the SNM. The scribed note accurately reflects my personal services and decisions made by me.     KERRY Renee, CNM

## 2023-02-06 NOTE — PROVIDER NOTIFICATION
02/06/23 1121   Provider Notification   Provider Name/Title Dr. Simin Byrd   Method of Notification Electronic Page   Request Evaluate-Remote   Notification Reason Status Update     RM, congested cough, mucinex?Thanks, Davis [phone]

## 2023-02-06 NOTE — PROVIDER NOTIFICATION
02/06/23 1116   Provider Notification   Provider Name/Title MDA   Method of Notification Electronic Page   Request Evaluate-Remote   Notification Reason Status Update   addendum, notified anesthesia resident via Ruby fernández, c/o intermittent ringing in both ears. states since yesterday.

## 2023-02-06 NOTE — PROGRESS NOTES
"    Anesthesia Post-Partum Follow-Up Note After  with Epidural    Patient: Dariana Castellon    Patient location: Post-partum floor.    Anesthesia type: Epidural     Subjective:   Anesthesia resident was contacted by patient's RN with concerns for mild headache and ringing in ears.    Patient reports a mild headache this morning that has since improved with hydration and oral pain medicine. Endorses \"ringing\" or \"plugging\" of her ears that is positional, meaning more present when she is laying on that side or leaning forward. These symptoms are accompanied by sinus congestion and cough, in which she believes she is getting a cold.     She does not complain of pruritis at this time. She denies weakness, denies paresthesia, denies difficulties breathing or voiding, denies nausea or vomiting. She is able to ambulate and tolerates regular diet.    Objective:    Respiratory Function (RR / SpO2 / Airway Patency): Satisfactory    Cardiac Function (HR / Rhythm / BP): Satisfactory    Strength and sensation lower extremities: Normal. Ambulating independently without assistance.    Site of epidural insertion: No signs of infection or inflammation.     Last Vitals: /72 (BP Location: Left arm, Patient Position: Semi-Rios's, Cuff Size: Adult Regular)   Pulse 78   Temp 37  C (98.6  F) (Oral)   Resp 16   LMP 2022   SpO2 99%   Breastfeeding Unknown     Assessment and plan:   Dariana Castellon is a 33 year old female  post-partum #1 s/p  with epidural for labor analgesia. This successfully provided labor analgesia. The patient delivered via  and the epidural catheter was removed immediately thereafter by the L&D RN with the catheter tip in tact per chart review.     Although patient is reporting tinnitus, mild headache - however, is ambulating independently and these symptoms seem more related to her congestion and URI. Procedure documented as atraumatic, without dural puncture or multiple attempts " and there appears no positional component to headache, low concern for PDPH at this time. A thorough review of systems was negative for other neurological symptoms including but not limited to somnolence, metallic taste, dizziness; catheter removed promptly after delivery - very low suspicion for LAST.     At this time, there is low suspicion of symptoms being associated with the insertion or removal of the epidural catheter. If the patient develops new lower extremity paresis or paresthesias; or if there are concerns regarding the insertion site of the catheter, please reach out to the Dept of Anesthesiology.    Thank you for including us in the care of this patient.    Ruby Alvarado MD  Anesthesiology CA-1

## 2023-02-07 VITALS
SYSTOLIC BLOOD PRESSURE: 105 MMHG | TEMPERATURE: 98 F | RESPIRATION RATE: 16 BRPM | HEART RATE: 86 BPM | DIASTOLIC BLOOD PRESSURE: 65 MMHG | OXYGEN SATURATION: 99 %

## 2023-02-07 PROCEDURE — 250N000013 HC RX MED GY IP 250 OP 250 PS 637: Performed by: ADVANCED PRACTICE MIDWIFE

## 2023-02-07 RX ADMIN — IBUPROFEN 800 MG: 800 TABLET, FILM COATED ORAL at 07:36

## 2023-02-07 RX ADMIN — ACETAMINOPHEN 650 MG: 325 TABLET, FILM COATED ORAL at 04:00

## 2023-02-07 RX ADMIN — DOCUSATE SODIUM 100 MG: 100 CAPSULE, LIQUID FILLED ORAL at 07:36

## 2023-02-07 ASSESSMENT — ACTIVITIES OF DAILY LIVING (ADL)
ADLS_ACUITY_SCORE: 18

## 2023-02-07 NOTE — PLAN OF CARE
Data: Vital signs within normal limits. Postpartum checks within normal limits - see flow record. Patient eating and drinking normally. Patient able to empty bladder independently and is up ambulating. No apparent signs of infection.  Patient performing self cares and is able to care for infant. Took infant for 2 hours to let patient sleep. Fundus firm and right of umbilicus.  Action: Patient declined medication during the shift for pain. See MAR. Patient stated she was comfortable.   Response: Positive attachment behaviors observed with infant.   Plan: Continue to follow care plan.

## 2023-02-07 NOTE — PLAN OF CARE
Goal Outcome Evaluation:  VSS and postpartum assessments WDL.  Up ad radha with steady gait and independent with cares.  Bonding well with infant.  Planning to breastfeed at home, pt breastfeed once during the night and supplemented with 10-20mL of donor milk every 3 hours.  Pain managed with Tylenol.   present and supportive overnight.  Will continue with postpartum cares and education per plan of care.

## 2023-02-07 NOTE — PLAN OF CARE
Assessment complete and WDL. VSS. Pt complains of headache this shift and pain is resolved with tylenol and ibuprofen. Pt denies any pre-e symptoms. Pt is breastfeeding independently, up ad radha and voiding WDL. Pt to discharge to home this morning.    Discharge paperwork covered with patient. New family gift given to patient. Discharge meds given to patient and signed for. Pt and spouse are aware of what to look for and when to seek medical attention.     Discharge paperwork signed and completed at 0945. Waiting for transportation to bring patient and family to car.

## 2023-02-07 NOTE — PLAN OF CARE
[facility] Discharge Summary    Dariana Castellon MRN# [id number]   Age: 33 year old YOB: 1990     Date of Admission:  2023  Date of Discharge::  2023  Admitting Physician:  KERRY Epstein CNM  Discharge Physician:  Iza Arias CNM      Home clinic: [facility]          Admission Diagnoses:   Labor and delivery, indication for care [O75.9]          Discharge Diagnosis:     Normal spontaneous vaginal delivery  Intrauterine pregnancy at 39+6 weeks gestation          Procedures:     Procedure(s): Repair of first degree perineal laceration              Medications Prior to Admission:     Medications Prior to Admission   Medication Sig Dispense Refill Last Dose     Prenatal Vit-Fe Fumarate-FA (PNV PRENATAL PLUS MULTIVITAMIN) 27-1 MG TABS per tablet Take 1 tablet by mouth daily        [DISCONTINUED] Ascorbic Acid (VITAMIN C) 500 MG CAPS         [DISCONTINUED] Ferrous Gluconate 239 (27 Fe) MG TABS Take 1 capsule by mouth        [DISCONTINUED] hydrOXYzine (ATARAX) 25 MG tablet Take 1 tablet (25 mg) by mouth every 8 hours as needed for itching 10 tablet 0              Discharge Medications:     Current Discharge Medication List      START taking these medications    Details   Benzocaine-Menthol (CEPACOL) 15-2.3 MG LOZG Take 1 tablet by mouth 3 times daily as needed (sore throat)  Qty: 16 lozenge, Refills: 0    Associated Diagnoses: Nasal congestion      cyanocobalamin (VITAMIN B-12) 1000 MCG tablet Take 1 tablet (1,000 mcg) by mouth daily  Qty: 90 tablet, Refills: 0    Associated Diagnoses:  (normal spontaneous vaginal delivery); Anemia, unspecified type      ferrous sulfate (FEROSUL) 325 (65 Fe) MG tablet Take 1 tablet (325 mg) by mouth daily (with breakfast)  Qty: 90 tablet, Refills: 0    Associated Diagnoses:  (normal spontaneous vaginal delivery); Anemia, unspecified type      guaiFENesin (MUCINEX) 600 MG 12 hr tablet Take 2 tablets (1,200 mg) by  "mouth 2 times daily as needed for congestion  Qty: 20 tablet, Refills: 0    Associated Diagnoses: Nasal congestion      ibuprofen (ADVIL/MOTRIN) 600 MG tablet Take 1 tablet (600 mg) by mouth every 6 hours as needed for moderate pain (4-6) Start after delivery  Qty: 60 tablet, Refills: 0    Associated Diagnoses:  (normal spontaneous vaginal delivery)      senna-docusate (SENOKOT-S/PERICOLACE) 8.6-50 MG tablet Take 1 tablet by mouth daily Start after delivery.  Qty: 100 tablet, Refills: 0    Associated Diagnoses:  (normal spontaneous vaginal delivery)      vitamin C (ASCORBIC ACID) 250 MG tablet Take 1 tablet (250 mg) by mouth daily  Qty: 90 tablet, Refills: 0    Associated Diagnoses:  (normal spontaneous vaginal delivery); Anemia, unspecified type         CONTINUE these medications which have NOT CHANGED    Details   Prenatal Vit-Fe Fumarate-FA (PNV PRENATAL PLUS MULTIVITAMIN) 27-1 MG TABS per tablet Take 1 tablet by mouth daily         STOP taking these medications       Ascorbic Acid (VITAMIN C) 500 MG CAPS Comments:   Reason for Stopping:         Ferrous Gluconate 239 (27 Fe) MG TABS Comments:   Reason for Stopping:         hydrOXYzine (ATARAX) 25 MG tablet Comments:   Reason for Stopping:                     Consultations:   No consultations were requested during this admission          Brief History of Labor:   32 yo , , GBS+ not adequately treated, 1st degree laceration repaired.     Assessment Day of Discharge    Vital signs:  Temp: 98  F (36.7  C) Temp src: Oral BP: 105/65 Pulse: 86   Resp: 16   O2 Device: None (Room air)        Estimated body mass index is 27.1 kg/m  as calculated from the following:    Height as of an earlier encounter on 23: 1.6 m (5' 3\").    Weight as of an earlier encounter on 23: 69.4 kg (153 lb).    Breasts: Soft, filling  Nipples: Non-tender, tiny crack on right nipple  Abdomen: Soft, Non-tender    Uterus: Fundus Firm, Non-tender, located -2 below the " umbilicus   Lochia: Rubra, appropriate amount    Perineum:  Well-approximated, healing well  Lower Extremities: No Edema Bilateral, Negative Fredy's Sign           Hospital Course:   The patient's hospital course was unremarkable.  On discharge, pain was well controlled with ibuprofen. Vaginal bleeding is similar to peak menstrual flow.  Voiding without difficulty.  Ambulating well and tolerating a normal diet.  No fever. Reports mild pain rated 2/10 that comes and goes in left ear, patient is also developing a cold and was prescribed Mucinex and lozenges, encouraged to try those and call PCP if pain persists.  Breastfeeding is going well, reports good latch but feels a small crack in right nipple, pt encouraged to rub colostrum/breastmilk over it after feeding and to use nipple cream, as needed.  Infant is stable. Had bowel movement. Mood stable, has good family supports identified.  Dariana Castellon was discharged on post-partum day #2.    Post-partum hemoglobin:   Hemoglobin   Date Value Ref Range Status   2023 11.2 (L) 11.7 - 15.7 g/dL Final        Rh:positive, Rhogam n/a  Rubella status:Immune  Plan for contraception: Paraguard  Reviewed Chapter One of  [facility] Family Book including warning signs of postpartum, activity level, avoiding IC for 6 weeks, Tub soaks BID, Kegels, abdominal exercises, breast care,  postpartum depression/anxiety.  Reviewed how to establish/maintain milk supply, nipple care, pumping for storage, fore/hind milk, wet/dirty diapers to expect each day, resources prn if questions or concerns.  Pt verbalized understanding with teach back.          Discharge Instructions and Follow-Up:     Discharge diet: Regular, Iron Rich, High Fiber, Minimum 80oz water daily   Discharge activity: Pelvic rest: abstain from intercourse and do not use tampons for 6 week(s)   Discharge follow-up: Follow up with [facility] in 2 weeks (virtual), then  Follow up with [facility] in 6 weeks   Wound care: Drink plenty of  fluids  Ice to area for comfort as needed  Keep wound clean and dry   Avoid constipation   Sitz bath TID            Discharge Disposition:     Discharged to home      I, TAMANNA Stock, am serving as a scribe to document services personally performed by the CNM, based on data collection and the provider's statements to me.     TAMANNA Stock    The encounter was performed by me and scribed by the SNM. The scribed note accurately reflects my personal services and decisions made by me.   Charley Arias CNM APRN

## 2023-02-12 ENCOUNTER — HEALTH MAINTENANCE LETTER (OUTPATIENT)
Age: 33
End: 2023-02-12

## 2023-02-20 ENCOUNTER — OFFICE VISIT (OUTPATIENT)
Dept: OBGYN | Facility: CLINIC | Age: 33
End: 2023-02-20
Attending: ADVANCED PRACTICE MIDWIFE
Payer: COMMERCIAL

## 2023-02-20 VITALS
WEIGHT: 149 LBS | HEART RATE: 78 BPM | SYSTOLIC BLOOD PRESSURE: 107 MMHG | BODY MASS INDEX: 26.4 KG/M2 | HEIGHT: 63 IN | DIASTOLIC BLOOD PRESSURE: 73 MMHG

## 2023-02-20 PROCEDURE — 99207 PR POST PARTUM EXAM: CPT | Performed by: ADVANCED PRACTICE MIDWIFE

## 2023-02-20 PROCEDURE — G0463 HOSPITAL OUTPT CLINIC VISIT: HCPCS | Performed by: ADVANCED PRACTICE MIDWIFE

## 2023-02-20 NOTE — PROGRESS NOTES
2-week Postpartum Visit:     Assessment:   32yo  at 2 weeks postpartum   NSVB 23, 1st laceration repaired   Lactating mother   Contraceptive Counseling, considering ParaGard.   PHQ2: 0  Pp hgb 10.3  Last Pap ??    Plan:   1. Adjustment to parenting, self care and importance of a support system discussed. Postpartum education given including: postpartum mood changes and postpartum depression. Offered resources for postpartum mental health.   2. Return of fertility discussed. Plans Paragard for contraception. Education given on this method. Advised to abstain from intercourse until she has completed her 6 week physical examination. May consider waiting to have the Paragard inserted until 6 months postpartum, discussed risk of unplanned pregnancy   3. Nutrition and supplements reviewed.  Advised continuation of a prenatal or multivitamin, also Vitamin D3 2000 IU geltab daily and an omega 3 fatty acid supplement.  4. Reviewed warning signs of pelvic pain, excessive bleeding or abdominal pain, fever/chills, or signs of breast infection.   5. Breastfeeding support resource list and contact info given, including [facility], [facility], local LLL groups, and new moms groups. Warning signs of breast infections (mastitis and thrush) reviewed.  6. Follow-up at 6 weeks postpartum for routine visit or sooner as needed.  7. Will plan to repeat CBC at 6 weeks pp    20 minutes spent on the date of the encounter doing chart review, review of test results, patient visit and documentation       KERRY Ramirez, PREETI     Subjective:      Dariana Castellon is a 33 year old female who presents with her brother Spenser for a postpartum follow up visit. She is 2 weeks postpartum following a spontaneous vaginal delivery. I have fully reviewed the prenatal and intrapartum course. The delivery was at 39/6 gestational weeks. Outcome: spontaneous vaginal delivery.  "      The amount and color of the lochia is appropriate for the duration of recovery. The patient feels well. The baby is well and being fed by breast, also giving some formula, pumping wale. Voiding without difficulty, lochia normal, tolerating normal diet, and passing flatus.  Pain is well controlled with current medications.  The patient has no emotional concerns.  Postpartum depression screening score: PHQ2 = 0    Prior to pregnancy menstrual cycles typically is heavier for a few days then gets lighter, total 5 days. Hoping she won't have heavy menstrual bleeding with the Paragard.    The following portions of the patient's history were reviewed and updated as appropriate: allergies, current medications and problem list    Review of Systems  General:  Denies problem  Eyes: Denies problem  Ears/Nose/Throat: Right ear feels blocked- was at ENT this morning.   Cardiovascular: Denies problem  Respiratory:  Denies problem  Gastrointestinal:  Denies problem, Genitourinary: Denies problem  Musculoskeletal:  Denies problem  Skin: Denies problem  Neurologic: Denies problem  Psychiatric: Denies problem  Endocrine: Denies problem  Heme/Lymphatic: Denies problem   Allergic/Immunologic: Denies problem    Objective:        Vitals:    02/20/23 1542   BP: 107/73   Pulse: 78   Weight: 67.6 kg (149 lb)   Height: 1.6 m (5' 3\")     PHQ-2 Score:     PHQ-2 ( 1999 Pfizer) 2/20/2023 2/3/2023   Q1: Little interest or pleasure in doing things 0 0   Q2: Feeling down, depressed or hopeless 0 0   PHQ-2 Score 0 0   Q1: Little interest or pleasure in doing things Not at all Not at all   Q2: Feeling down, depressed or hopeless Not at all Not at all   PHQ-2 Score 0 0       No Physical Exam:  General: Pleasant, articulate, well-groomed, well-nourished female.  Not in any apparent distress  Neck: Supple  Lungs:  nonlabored breathing pattern  Neuro: alert and oriented x 3     Scribe Disclosure:   I Belia Shelton, P student,  am serving as a " scribe; to document services personally performed by   KERRY Ramirez CNM  based on data collection and the provider's statements to me.     I agree with past family and social history and review of systems completed by the Medical/Advanced Practice Provider student except for changes made by me. The remainder of the encounter was performed by me and scribed by the student. The scribed note accurately reflects personal services and decisions made by me.    KERRY Ramirez CNM

## 2023-02-20 NOTE — LETTER
2023     RE: Dariana Mcduffiekiel  [address]  [place] [address]     Dear Colleague,    Thank you for referring your patient, Dariana Castellon, to the [facility] [place] at [facility]. Please see a copy of my visit note below.    2-week Postpartum Visit:     Assessment:   34yo  at 2 weeks postpartum   NSVB 23, 1st laceration repaired   Lactating mother   Contraceptive Counseling, considering ParaGard.   PHQ2: 0  Pp hgb 10.3  Last Pap ??    Plan:   1. Adjustment to parenting, self care and importance of a support system discussed. Postpartum education given including: postpartum mood changes and postpartum depression. Offered resources for postpartum mental health.   2. Return of fertility discussed. Plans Paragard for contraception. Education given on this method. Advised to abstain from intercourse until she has completed her 6 week physical examination. May consider waiting to have the Paragard inserted until 6 months postpartum, discussed risk of unplanned pregnancy   3. Nutrition and supplements reviewed.  Advised continuation of a prenatal or multivitamin, also Vitamin D3 2000 IU geltab daily and an omega 3 fatty acid supplement.  4. Reviewed warning signs of pelvic pain, excessive bleeding or abdominal pain, fever/chills, or signs of breast infection.   5. Breastfeeding support resource list and contact info given, including [facility], [facility], local LLL groups, and new moms groups. Warning signs of breast infections (mastitis and thrush) reviewed.  6. Follow-up at 6 weeks postpartum for routine visit or sooner as needed.  7. Will plan to repeat CBC at 6 weeks pp    20 minutes spent on the date of the encounter doing chart review, review of test results, patient visit and documentation       KERRY Ramirez, PREETI     Subjective:      Dariana TEJEDA  "Cande is a 33 year old female who presents with her brother Spenser for a postpartum follow up visit. She is 2 weeks postpartum following a spontaneous vaginal delivery. I have fully reviewed the prenatal and intrapartum course. The delivery was at 39/6 gestational weeks. Outcome: spontaneous vaginal delivery.       The amount and color of the lochia is appropriate for the duration of recovery. The patient feels well. The baby is well and being fed by breast, also giving some formula, pumping wale. Voiding without difficulty, lochia normal, tolerating normal diet, and passing flatus.  Pain is well controlled with current medications.  The patient has no emotional concerns.  Postpartum depression screening score: PHQ2 = 0    Prior to pregnancy menstrual cycles typically is heavier for a few days then gets lighter, total 5 days. Hoping she won't have heavy menstrual bleeding with the Paragard.    The following portions of the patient's history were reviewed and updated as appropriate: allergies, current medications and problem list    Review of Systems  General:  Denies problem  Eyes: Denies problem  Ears/Nose/Throat: Right ear feels blocked- was at ENT this morning.   Cardiovascular: Denies problem  Respiratory:  Denies problem  Gastrointestinal:  Denies problem, Genitourinary: Denies problem  Musculoskeletal:  Denies problem  Skin: Denies problem  Neurologic: Denies problem  Psychiatric: Denies problem  Endocrine: Denies problem  Heme/Lymphatic: Denies problem   Allergic/Immunologic: Denies problem    Objective:        Vitals:    02/20/23 1542   BP: 107/73   Pulse: 78   Weight: 67.6 kg (149 lb)   Height: 1.6 m (5' 3\")     PHQ-2 Score:     PHQ-2 ( 1999 Pfizer) 2/20/2023 2/3/2023   Q1: Little interest or pleasure in doing things 0 0   Q2: Feeling down, depressed or hopeless 0 0   PHQ-2 Score 0 0   Q1: Little interest or pleasure in doing things Not at all Not at all   Q2: Feeling down, depressed or hopeless Not at " all Not at all   PHQ-2 Score 0 0       No Physical Exam:  General: Pleasant, articulate, well-groomed, well-nourished female.  Not in any apparent distress  Neck: Supple  Lungs:  nonlabored breathing pattern  Neuro: alert and oriented x 3     Scribe Disclosure:   I PAMELA Armijo student,  am serving as a scribe; to document services personally performed by   KERRY Ramirez CNM  based on data collection and the provider's statements to me.     I agree with past family and social history and review of systems completed by the Medical/Advanced Practice Provider student except for changes made by me. The remainder of the encounter was performed by me and scribed by the student. The scribed note accurately reflects personal services and decisions made by me.    KERRY Ramirez CNM

## 2023-02-20 NOTE — PATIENT INSTRUCTIONS
Thank you for trusting us with your care!     If you need to contact us for questions about:  Symptoms, Scheduling & Medical Questions; Non-urgent (2-3 day response) Vianey message, Urgent (needing response today) [phone] (if after 3:30pm next day response)   Prescriptions: Please call your Pharmacy   Billing: Jigna [phone] or [facility]:[phone]

## 2023-03-31 ENCOUNTER — OFFICE VISIT (OUTPATIENT)
Dept: OBGYN | Facility: CLINIC | Age: 33
End: 2023-03-31
Attending: ADVANCED PRACTICE MIDWIFE
Payer: COMMERCIAL

## 2023-03-31 VITALS
DIASTOLIC BLOOD PRESSURE: 77 MMHG | SYSTOLIC BLOOD PRESSURE: 112 MMHG | BODY MASS INDEX: 27.41 KG/M2 | HEIGHT: 63 IN | WEIGHT: 154.7 LBS | HEART RATE: 82 BPM

## 2023-03-31 DIAGNOSIS — J30.2 SEASONAL ALLERGIC RHINITIS, UNSPECIFIED TRIGGER: Primary | ICD-10-CM

## 2023-03-31 PROBLEM — O09.299 HX OF MATERNAL LACERATION, 3RD DEGREE, CURRENTLY PREGNANT: Status: RESOLVED | Noted: 2022-12-14 | Resolved: 2023-03-31

## 2023-03-31 PROBLEM — R82.71 GBS BACTERIURIA: Status: RESOLVED | Noted: 2022-12-14 | Resolved: 2023-03-31

## 2023-03-31 PROBLEM — O09.93 HIGH-RISK PREGNANCY, THIRD TRIMESTER: Status: RESOLVED | Noted: 2022-12-14 | Resolved: 2023-03-31

## 2023-03-31 PROCEDURE — G0463 HOSPITAL OUTPT CLINIC VISIT: HCPCS | Performed by: ADVANCED PRACTICE MIDWIFE

## 2023-03-31 PROCEDURE — 99207 PR POST PARTUM EXAM: CPT | Performed by: ADVANCED PRACTICE MIDWIFE

## 2023-03-31 RX ORDER — CETIRIZINE HYDROCHLORIDE 10 MG/1
10 TABLET ORAL DAILY
Qty: 90 TABLET | Refills: 3 | Status: SHIPPED | OUTPATIENT
Start: 2023-03-31 | End: 2024-05-03

## 2023-03-31 ASSESSMENT — ANXIETY QUESTIONNAIRES
5. BEING SO RESTLESS THAT IT IS HARD TO SIT STILL: NOT AT ALL
1. FEELING NERVOUS, ANXIOUS, OR ON EDGE: NOT AT ALL
GAD7 TOTAL SCORE: 0
2. NOT BEING ABLE TO STOP OR CONTROL WORRYING: NOT AT ALL
3. WORRYING TOO MUCH ABOUT DIFFERENT THINGS: NOT AT ALL
7. FEELING AFRAID AS IF SOMETHING AWFUL MIGHT HAPPEN: NOT AT ALL
6. BECOMING EASILY ANNOYED OR IRRITABLE: NOT AT ALL
GAD7 TOTAL SCORE: 0

## 2023-03-31 ASSESSMENT — PATIENT HEALTH QUESTIONNAIRE - PHQ9
5. POOR APPETITE OR OVEREATING: NOT AT ALL
SUM OF ALL RESPONSES TO PHQ QUESTIONS 1-9: 0

## 2023-03-31 ASSESSMENT — PAIN SCALES - GENERAL: PAINLEVEL: NO PAIN (0)

## 2023-03-31 NOTE — NURSING NOTE
SUBJECTIVE:   Dariana Castellon is here for her 6-week postpartum checkup.     PHQ-9 score:   Hx of Abuse:  No    Delivery Date: 23.    Delivering provider:  Charley Arias CNM.    Type of delivery:  .  Perineum:  in tact.     Delivery complications: None  Infant gender:  Girl michelle, weight 6 pounds 5 oz.  Feeding Method:  .  Complications reported with feeding:  none, infant thriving .    Bleeding:  None.  Duration:  4 weeks.  Menses resumed:  No  Bowel/Urinary problems:  No    Contraception Planned:  Wait for six months  She  has not had intercourse since delivery..

## 2023-03-31 NOTE — PATIENT INSTRUCTIONS
Thank you for trusting us with your care!     If you need to contact us for questions about:  Symptoms, Scheduling & Medical Questions; Non-urgent (2-3 day response) Vianey message, Urgent (needing response today) [phone] (if after 3:30pm next day response)   Prescriptions: Please call your Pharmacy   Billing: Jigna [phone] or [facility]:[phone]

## 2023-03-31 NOTE — PROGRESS NOTES
"Nursing Notes:   Tricia Beltran, LPN  3/31/2023  2:37 PM  Signed  SUBJECTIVE:   Dariana Castellon is here for her 6-week postpartum checkup.     PHQ-9 score:   Hx of Abuse:  No    Delivery Date: 23.    Delivering provider:  Charley Arias CNM.    Type of delivery:  .  Perineum: first degree laceration repaired..     Delivery complications: None  Infant gender:  Girl John, weight 6 pounds 5 oz.  Feeding Method:  .  Complications reported with feeding:  none, infant thriving .    Bleeding:  None.  Duration:  4 weeks.  Menses resumed:  No  Bowel/Urinary problems:  No    Contraception Planned:  Wait for six months  She  has not had intercourse since delivery..         ================================================================  ROS: 10 point ROS neg other than the symptoms noted above in the HPI.   Discussed contraception options, patient considering IUD or Nexplanon.  Desires to defer pap smear until she returns for contraception.  Consents to visual inspection of perineum today.    EXAM:  /77   Pulse 82   Ht 1.6 m (5' 2.99\")   Wt 70.2 kg (154 lb 11.2 oz)   LMP 2022   Breastfeeding Yes   BMI 27.41 kg/m      General: healthy, alert and no distress  Psych: NEGATIVE  Last PHQ-9 score on record= No Value exists for the : HP#PHQ9  Breasts:  Lactating, Nipples intact with no lesions, Non-tender and No S/S of yeast or mastitis  Abdomen: Benign, Soft, flat, non-tender, No masses, organomegaly and Diastasis less than 1-2 FB  Incision:  None   Vulva:  Normal genitalia and Bartholin's, Urethra, Poteau's normal  Vagina:  Laceration well healed  Bimanual exam deferred at patient request.      ASSESSMENT:   Encounter Diagnoses   Name Primary?     Seasonal allergic rhinitis, unspecified trigger Yes     Routine postpartum follow-up       Normal postpartum exam after   Pregnancy was complicated by:  none.      PLAN:       Risks and benefits of prescribed medications discussed.  Medication " instructions reviewed.  Contraception methods discussed  Discussed calcium intake, vitamins and supplements including Vitamin D  Exercise encouraged  Encouraged patient to return for pap smear and contraception  KERRY RevelesM

## 2023-03-31 NOTE — LETTER
"3/31/2023       RE: Dariana Castellon  [address]  [place] [address]     Dear Colleague,    Thank you for referring your patient, Dariana Castellon, to the [facility] [place] at [facility]. Please see a copy of my visit note below.    Nursing Notes:   Tricia Beltran LPN  3/31/2023  2:37 PM  Signed  SUBJECTIVE:   Dariana Castellon is here for her 6-week postpartum checkup.     PHQ-9 score:   Hx of Abuse:  No    Delivery Date: 23.    Delivering provider:  Charley Arias CNM.    Type of delivery:  .  Perineum: first degree laceration repaired..     Delivery complications: None  Infant gender:  Girl John, weight 6 pounds 5 oz.  Feeding Method:  .  Complications reported with feeding:  none, infant thriving .    Bleeding:  None.  Duration:  4 weeks.  Menses resumed:  No  Bowel/Urinary problems:  No    Contraception Planned:  Wait for six months  She  has not had intercourse since delivery..         ================================================================  ROS: 10 point ROS neg other than the symptoms noted above in the HPI.   Discussed contraception options, patient considering IUD or Nexplanon.  Desires to defer pap smear until she returns for contraception.  Consents to visual inspection of perineum today.    EXAM:  /77   Pulse 82   Ht 1.6 m (5' 2.99\")   Wt 70.2 kg (154 lb 11.2 oz)   LMP 2022   Breastfeeding Yes   BMI 27.41 kg/m      General: healthy, alert and no distress  Psych: NEGATIVE  Last PHQ-9 score on record= No Value exists for the : HP#PHQ9  Breasts:  Lactating, Nipples intact with no lesions, Non-tender and No S/S of yeast or mastitis  Abdomen: Benign, Soft, flat, non-tender, No masses, organomegaly and Diastasis less than 1-2 FB  Incision:  None   Vulva:  Normal genitalia and Bartholin's, Urethra, Leando's normal  Vagina:  Laceration well healed  Bimanual exam deferred at patient " request.      ASSESSMENT:   Encounter Diagnoses   Name Primary?    Seasonal allergic rhinitis, unspecified trigger Yes    Routine postpartum follow-up       Normal postpartum exam after   Pregnancy was complicated by:  none.      PLAN:       Risks and benefits of prescribed medications discussed.  Medication instructions reviewed.  Contraception methods discussed  Discussed calcium intake, vitamins and supplements including Vitamin D  Exercise encouraged  Encouraged patient to return for pap smear and contraception  KERRY Reveles CNM

## 2023-12-16 ENCOUNTER — HOSPITAL ENCOUNTER (EMERGENCY)
Facility: CLINIC | Age: 33
Discharge: HOME | End: 2023-12-16
Payer: COMMERCIAL

## 2023-12-16 VITALS
TEMPERATURE: 99.2 F | SYSTOLIC BLOOD PRESSURE: 106 MMHG | RESPIRATION RATE: 16 BRPM | HEART RATE: 94 BPM | DIASTOLIC BLOOD PRESSURE: 73 MMHG | OXYGEN SATURATION: 97 %

## 2023-12-16 DIAGNOSIS — U07.1 COVID-19 VIRUS INFECTION: ICD-10-CM

## 2023-12-16 DIAGNOSIS — J10.1 INFLUENZA A: ICD-10-CM

## 2023-12-16 LAB
FLUAV RNA SPEC QL NAA+PROBE: POSITIVE
FLUBV RNA RESP QL NAA+PROBE: NEGATIVE
RSV RNA SPEC NAA+PROBE: NEGATIVE
SARS-COV-2 RNA RESP QL NAA+PROBE: POSITIVE

## 2023-12-16 PROCEDURE — 99283 EMERGENCY DEPT VISIT LOW MDM: CPT

## 2023-12-16 PROCEDURE — 99284 EMERGENCY DEPT VISIT MOD MDM: CPT

## 2023-12-16 PROCEDURE — 250N000013 HC RX MED GY IP 250 OP 250 PS 637

## 2023-12-16 PROCEDURE — 87637 SARSCOV2&INF A&B&RSV AMP PRB: CPT | Performed by: FAMILY MEDICINE

## 2023-12-16 RX ORDER — OSELTAMIVIR PHOSPHATE 75 MG/1
75 CAPSULE ORAL 2 TIMES DAILY
Qty: 10 CAPSULE | Refills: 0 | Status: SHIPPED | OUTPATIENT
Start: 2023-12-16 | End: 2024-07-16

## 2023-12-16 RX ORDER — OSELTAMIVIR PHOSPHATE 75 MG/1
75 CAPSULE ORAL 2 TIMES DAILY
Qty: 10 CAPSULE | Refills: 0 | Status: SHIPPED | OUTPATIENT
Start: 2023-12-16 | End: 2023-12-16

## 2023-12-16 RX ORDER — ACETAMINOPHEN 500 MG
1000 TABLET ORAL ONCE
Status: COMPLETED | OUTPATIENT
Start: 2023-12-16 | End: 2023-12-16

## 2023-12-16 RX ADMIN — ACETAMINOPHEN 1000 MG: 500 TABLET ORAL at 14:24

## 2023-12-16 NOTE — DISCHARGE INSTRUCTIONS
You are influenza A and COVID-19 positive today in the ED  Begin Tamiflu for influenza diagnosis today and continue for 5-day course  Recommend over-the-counter cough medicine as well as Mucinex as needed for chest congestion  Follow-up closely with your PCP office next week for reevaluation recheck of your symptoms  Continue Tylenol and ibuprofen for fever, discomfort, and other symptomatic treatment  Continue plenty of fluids and diet as tolerated as you progressed to your viral illness  Return to the ED if any worsening or concerning signs or symptoms present

## 2023-12-16 NOTE — ED PROVIDER NOTES
"ED Provider Note  [facility]      History     Chief Complaint   Patient presents with    URI     Pt presents with cough, nasal congestion and overall genralized body aches for 10 days, did not take covid test. States \"I've never had a cold this bad\".      The history is provided by the patient. No  was used.   RAMONITA Castellon is a 33 year old female who presents to the ED with complaints of URI symptoms.  Past medical history notable for HSV 1 and currently lactating with most recent birth 10 months ago.  She states that she has been feeling ill over the past week and a half.  She states symptoms have been mild at the beginning but have significantly worsened over the past 1 to 2 days.  She reports subjective fevers, shortness of breath, cough, runny nose, and generalized unwell feeling.  She reports generalized myalgias as well and breast tenderness.  She denies any abnormal nipple discharge, breast swelling, redness, warmth, noted masses or fluid collections.  She states that she has had mastitis with her previous pregnancies and states this is not similar.  She otherwise denies any abdominal discomfort, nausea, vomiting, urinary symptoms, or changes in her bowel movements.  She states that she has been constipated but her last bowel movement was yesterday denying any melena or hematochezia.  She continues to pass gas appropriately.  She last had Tylenol yesterday evening for her symptoms.    Past Medical History  Past Medical History:   Diagnosis Date    Hx of maternal laceration, 3rd degree, currently pregnant 12/14/2022     History reviewed. No pertinent surgical history.  oseltamivir (TAMIFLU) 75 MG capsule  Benzocaine-Menthol (CEPACOL) 15-2.3 MG LOZG  cetirizine (ZYRTEC) 10 MG tablet  Prenatal Vit-Fe Fumarate-FA (PNV PRENATAL PLUS MULTIVITAMIN) 27-1 MG TABS per tablet  vitamin C (ASCORBIC ACID) 250 MG tablet      No Known Allergies  Family " History  History reviewed. No pertinent family history.  Social History   Social History     Tobacco Use    Smoking status: Never    Smokeless tobacco: Never   Vaping Use    Vaping Use: Never used   Substance Use Topics    Alcohol use: Never    Drug use: Never      Past medical history, past surgical history, medications, allergies, family history, and social history were reviewed with the patient. No additional pertinent items.      A medically appropriate review of systems was performed with pertinent positives and negatives noted in the HPI, and all other systems negative.    Physical Exam   BP: 106/73  Pulse: 94  Temp: 99.2  F (37.3  C)  Resp: 16  SpO2: 97 %  Physical Exam  Constitutional:       General: She is not in acute distress.     Appearance: Normal appearance. She is ill-appearing. She is not toxic-appearing or diaphoretic.   HENT:      Nose: Nose normal.      Mouth/Throat:      Mouth: Mucous membranes are moist.      Pharynx: Oropharynx is clear.   Cardiovascular:      Rate and Rhythm: Normal rate and regular rhythm.      Pulses: Normal pulses.   Pulmonary:      Effort: Pulmonary effort is normal. No respiratory distress.      Breath sounds: Normal breath sounds. No stridor. No wheezing, rhonchi or rales.   Chest:      Chest wall: Tenderness present. No mass, deformity or swelling.   Abdominal:      General: Abdomen is flat. Bowel sounds are normal.      Palpations: Abdomen is soft.   Musculoskeletal:         General: Normal range of motion.   Skin:     General: Skin is warm.      Capillary Refill: Capillary refill takes less than 2 seconds.   Neurological:      General: No focal deficit present.      Mental Status: She is alert and oriented to person, place, and time.   Psychiatric:         Mood and Affect: Mood normal.         Behavior: Behavior normal.           ED Course, Procedures, & Data     ED Course as of 12/16/23 1440   Sat Dec 16, 2023   1413 Patient refused XR chest for evaluation of  pneumonia vs other chest pathology due to cough, SOB, subjective fevers, and chills for 1.5 weeks.  Discussed risks foregoing chest x-ray at this time with duration of symptoms and patient voiced understanding.  We will see the what the respiratory swab comes up as if anything and reassess and discuss further if need to pursue chest x-ray imaging is more warranted.   1421 Influenza A(!): Positive   1421 SARS CoV2 PCR(!): Positive     Procedures                Results for orders placed or performed during the hospital encounter of 12/16/23   Symptomatic Influenza A/B, RSV, & SARS-CoV2 PCR (COVID-19) Nasopharyngeal     Status: Abnormal    Specimen: Nasopharyngeal; Swab   Result Value Ref Range    Influenza A PCR Positive (A) Negative    Influenza B PCR Negative Negative    RSV PCR Negative Negative    SARS CoV2 PCR Positive (A) Negative    Narrative    Testing was performed using the Xpert Xpress CoV2/Flu/RSV Assay on the [employer] GeneXpert Instrument. This test should be ordered for the detection of SARS-CoV-2, influenza, and RSV viruses in individuals who meet clinical and/or epidemiological criteria. Test performance is unknown in asymptomatic patients. This test is for in vitro diagnostic use under the FDA EUA for laboratories certified under CLIA to perform high or moderate complexity testing. This test has not been FDA cleared or approved. A negative result does not rule out the presence of PCR inhibitors in the specimen or target RNA in concentration below the limit of detection for the assay. If only one viral target is positive but coinfection with multiple targets is suspected, the sample should be re-tested with another FDA cleared, approved, or authorized test, if coinfection would change clinical management. This test was validated by the [facility]. These laboratories are certified under the Clinical Laboratory Improvement Amendments of 1988 (CLIA-88) as qualified to perform high  complexity laboratory testing.     Medications   acetaminophen (TYLENOL) tablet 1,000 mg (1,000 mg Oral $Given 12/16/23 3141)     Labs Ordered and Resulted from Time of ED Arrival to Time of ED Departure   INFLUENZA A/B, RSV, & SARS-COV2 PCR - Abnormal       Result Value    Influenza A PCR Positive (*)     Influenza B PCR Negative      RSV PCR Negative      SARS CoV2 PCR Positive (*)      No orders to display          Critical care was not performed.     Medical Decision Making  The patient's presentation was of low complexity (an acute and uncomplicated illness or injury).    The patient's evaluation involved:  ordering and/or review of 1 test(s) in this encounter (see separate area of note for details)    The patient's management necessitated moderate risk (prescription drug management including medications given in the ED).    Assessment & Plan    Dariana is a 33-year-old female that presented for upper respiratory symptoms.  Acceptable vital signs with no fever, tachycardia, hypotension.  No hypoxia on room air.  No respiratory distress or adventitious lung sounds on exam.  No breast fluctuance, redness, warmth, or nipple discharge.  Low suspicion for mastitis.  COVID-19 and influenza A positive.  P.o. Tylenol in the ED.  Suspect moderate to severe symptoms due to to viral illnesses.  Patient stable for discharge home at this time.  Strict return precautions given.  Due to patient's notable worsening of symptoms in the last 24 to 48 hours, will trial Tamiflu for 5 days over Paxlovid.  Continue Tylenol and ibuprofen as needed for symptoms.  Continue plenty of fluids and diet as tolerated.  Recommended over-the-counter cough medicine as well as Mucinex as needed for cough and chest congestion, respectively.  Follow-up with PCP office next week and PCP referral was placed at ED discharge for close management.  Patient was agreeable to the discharge treatment plan, voiced understanding, and all questions were answered  prior to discharge.    I have reviewed the nursing notes. I have reviewed the findings, diagnosis, plan and need for follow up with the patient.    Discharge Medication List as of 12/16/2023  2:32 PM          Final diagnoses:   Influenza A   COVID-19 virus infection       Tessa Wright PA-C    [facility] EMERGENCY DEPARTMENT  12/16/2023     Tessa Wright PA-C  12/16/23 1444

## 2024-01-31 NOTE — PROGRESS NOTES
"Nexplanon Insertion:    Is a pregnancy test required: Yes.  Was it positive or negative?  Negative  Was a consent obtained?  Yes    Subjective: Dariana Castellon is a 34 year old  presents for Nexplanon.    Patient has been given the opportunity to ask questions about all forms of birth control, including all options appropriate for Dariana Castellon. Discussed that no method of birth control, except abstinence is 100% effective against pregnancy or sexually transmitted infection.   PAP -normal, in [place]    Dariana Castellon understands she may have the Nexplanon removed at any time and it should be removed by a health care provider.    The entire insertion procedure was reviewed with the patient, including care after placement.    Patient's last menstrual period was 2024 (approximate). Last sexual activity: 2 weeks ago, used condoms . No allergy to betadine or shellfish. Patient desires STD screening with an annual visit. Will schedule an appointment before she leaves    HCG Qual Urine   Date Value Ref Range Status   2024 Negative Negative Final       /72   Pulse 71   Ht 1.6 m (5' 2.99\")   Wt 70.3 kg (155 lb)   LMP 2024 (Approximate)   BMI 27.47 kg/m      PROCEDURE NOTE: -- Nexplanon Insertion    Reason for Insertion: contraception    Patient was placed supine with left arm exposed.  Cali was made 8-10 cm above medial epicondyle and a guiding cali 4 cm above the first.  Arm was prepped with Betadine. Insertion point was anesthetized with 2 mL 1% lidocaine. After stretching the skin with thumb and index finger around the insertion site, skin punctured with the tip of the needle inserted at 30 degrees and then lowered to horizontal position. The needle was then advanced to its full length. Applicator was then stabilized and slider was unlocked. Slider was pulled back until it stopped and then removed.    Correct placement of the implant was confirmed by palpation in the patient's " arm and visualizing the purple top of the obturator.   Bandage and pressure dressing applied to insertion site.    Lot # [id number]  Exp: November 2025    EBL: minimal    Complications: none    ASSESSMENT/ PLAN:    (Z30.017) Nexplanon insertion  (primary encounter diagnosis)  Comment: Given 's handouts, including when to have Nexplanon removed, list of danger s/sx, side effects and follow up recommended. Encouraged condom use for prevention of STD. Back up contraception advised for 7 days. Advised to call for any fever, for prolonged or severe pain or bleeding, abnormal vaginal dischage. She was advised to use pain medications (ibuprofen) as needed for mild to moderate pain.    -Advised to replace Nexplanon in 5 years  Plan: hCG qual urine POCT, etonogestrel (NEXPLANON)         subdermal implant 68 mg, Insertion         non-biodegradable drug delivery implant            Ana BOND, am serving as a scribe; to document services personally performed by  KERRY Martin CNM based on data collection and the provider's statements to me.     TAMANNA Leal    The encounter was performed by me and scribed by the SNM. The scribed note accurately reflects my personal services and decisions made by me.     KERRY Martin CNM

## 2024-01-31 NOTE — PATIENT INSTRUCTIONS
Thank you for trusting us with your care!     If you need to contact us for questions about:  Symptoms, Scheduling & Medical Questions; Non-urgent (2-3 day response) [place] message, Urgent (needing response today) [phone] (if after 3:30pm next day response)   Prescriptions: Please call your Pharmacy   Billing: [facility] [phone] or [facility]:[phone]      NEXPLANON AFTERCARE INSTRUCTIONS   Keep dressing on and dry for 24 hours, then remove wrap.  Replace bandaid daily for 5 days. Keep your user card in a safe place where you'll remember it.    You may have some pain at the site of the Nexplanon insertion. You can help relieve the discomfort with Tylenol (acetaminophen), Aspirin or Advil (ibuprofen). If your discomfort worsens or you notice redness spreading on the skin around the insertion site, please call the clinic.     Irregular bleeding is common with Nexplanon, especially in the first 6-12 months of use. After one year, approximately 20% of women who use Nexplanon will stop having periods completely. Some women have longer, heavier periods. Some women will have increased spotting between periods. You may find that your periods may be hard to predict.     The Nexplanon does not protect against sexually transmitted infections including the AIDS virus (HIV), warts (HPV), gonorrhea, Chlamydia, and herpes. Condoms should be used to decrease the risk sexually transmitted infections. If you think that you have been exposed to a sexually transmitted infection, please call the clinic.     If you had Nexplanon placed for birth control, it is effective immediately if it was inserted within five days after the start of your period. If you have Nexplanon inserted at any other time during your menstrual cycle, use another method of birth control, like condoms for at least 7 days.     The Nexplanon should be removed and/or replaced by a health care provider after three years.   Warning Signs   Call the clinic  if any of the following occurs:    You have bleeding, pus, or increasing redness, or pain at insertion site.    You have fever or chills    The implant comes out or you have concerns about its location.    You have a positive pregnancy test or suspect you might be pregnant.

## 2024-02-01 ENCOUNTER — OFFICE VISIT (OUTPATIENT)
Dept: OBGYN | Facility: CLINIC | Age: 34
End: 2024-02-01
Attending: MIDWIFE
Payer: COMMERCIAL

## 2024-02-01 VITALS
HEIGHT: 63 IN | SYSTOLIC BLOOD PRESSURE: 105 MMHG | WEIGHT: 155 LBS | DIASTOLIC BLOOD PRESSURE: 72 MMHG | BODY MASS INDEX: 27.46 KG/M2 | HEART RATE: 71 BPM

## 2024-02-01 DIAGNOSIS — Z30.017 NEXPLANON INSERTION: Primary | ICD-10-CM

## 2024-02-01 DIAGNOSIS — Z97.5 NEXPLANON IN PLACE: ICD-10-CM

## 2024-02-01 LAB
HCG UR QL: NEGATIVE
INTERNAL QC OK POCT: NORMAL
POCT KIT EXPIRATION DATE: NORMAL
POCT KIT LOT NUMBER: NORMAL

## 2024-02-01 PROCEDURE — 250N000011 HC RX IP 250 OP 636: Performed by: MIDWIFE

## 2024-02-01 PROCEDURE — 11981 INSERTION DRUG DLVR IMPLANT: CPT | Performed by: MIDWIFE

## 2024-02-01 PROCEDURE — 81025 URINE PREGNANCY TEST: CPT | Performed by: MIDWIFE

## 2024-02-01 RX ADMIN — ETONOGESTREL 68 MG: 68 IMPLANT SUBCUTANEOUS at 15:22

## 2024-02-01 NOTE — LETTER
"2024       RE: Dariana Castellon  [address]  [place] [address]     Dear Colleague,    Thank you for referring your patient, Dariana Castellon, to the [facility] [place] [facility]. Please see a copy of my visit note below.    Nexplanon Insertion:    Is a pregnancy test required: Yes.  Was it positive or negative?  Negative  Was a consent obtained?  Yes    Subjective: Dariana Castellon is a 34 year old  presents for Nexplanon.    Patient has been given the opportunity to ask questions about all forms of birth control, including all options appropriate for Dariana Castellon. Discussed that no method of birth control, except abstinence is 100% effective against pregnancy or sexually transmitted infection.   PAP -normal, in [place]    Dariana Castellon understands she may have the Nexplanon removed at any time and it should be removed by a health care provider.    The entire insertion procedure was reviewed with the patient, including care after placement.    Patient's last menstrual period was 2024 (approximate). Last sexual activity: 2 weeks ago, used condoms . No allergy to betadine or shellfish. Patient desires STD screening with an annual visit. Will schedule an appointment before she leaves    HCG Qual Urine   Date Value Ref Range Status   2024 Negative Negative Final       /72   Pulse 71   Ht 1.6 m (5' 2.99\")   Wt 70.3 kg (155 lb)   LMP 2024 (Approximate)   BMI 27.47 kg/m      PROCEDURE NOTE: -- Nexplanon Insertion    Reason for Insertion: contraception    Patient was placed supine with left arm exposed.  Cali was made 8-10 cm above medial epicondyle and a guiding cali 4 cm above the first.  Arm was prepped with Betadine. Insertion point was anesthetized with 2 mL 1% lidocaine. After stretching the skin with thumb and index finger around the insertion site, skin punctured with the tip of the " needle inserted at 30 degrees and then lowered to horizontal position. The needle was then advanced to its full length. Applicator was then stabilized and slider was unlocked. Slider was pulled back until it stopped and then removed.    Correct placement of the implant was confirmed by palpation in the patient's arm and visualizing the purple top of the obturator.   Bandage and pressure dressing applied to insertion site.    Lot # [id number]  Exp: November 2025    EBL: minimal    Complications: none    ASSESSMENT/ PLAN:    (Z30.017) Nexplanon insertion  (primary encounter diagnosis)  Comment: Given 's handouts, including when to have Nexplanon removed, list of danger s/sx, side effects and follow up recommended. Encouraged condom use for prevention of STD. Back up contraception advised for 7 days. Advised to call for any fever, for prolonged or severe pain or bleeding, abnormal vaginal dischage. She was advised to use pain medications (ibuprofen) as needed for mild to moderate pain.    -Advised to replace Nexplanon in 5 years  Plan: hCG qual urine POCT, etonogestrel (NEXPLANON)         subdermal implant 68 mg, Insertion         non-biodegradable drug delivery implant            IAna, am serving as a scribe; to document services personally performed by  KERRY Martin CNM based on data collection and the provider's statements to me.     TAMANNA Leal    The encounter was performed by me and scribed by the SNM. The scribed note accurately reflects my personal services and decisions made by me.     KERRY Martin CNM

## 2024-03-10 ENCOUNTER — HEALTH MAINTENANCE LETTER (OUTPATIENT)
Age: 34
End: 2024-03-10

## 2024-04-26 ENCOUNTER — E-VISIT (OUTPATIENT)
Dept: URGENT CARE | Facility: CLINIC | Age: 34
End: 2024-04-26
Payer: COMMERCIAL

## 2024-04-26 DIAGNOSIS — R21 RASH AND NONSPECIFIC SKIN ERUPTION: Primary | ICD-10-CM

## 2024-04-26 DIAGNOSIS — J02.9 SORE THROAT: Primary | ICD-10-CM

## 2024-04-26 PROCEDURE — 99207 PR NON-BILLABLE SERV PER CHARTING: CPT | Performed by: NURSE PRACTITIONER

## 2024-04-27 NOTE — PATIENT INSTRUCTIONS
Dear Dariana Castellon?     After reviewing your responses, I am unable to make a diagnosis that can be treated online.    You will not be charged for this eVisit.     We are dedicated to helping you achieve your best health and would like to see you in one of our many clinic locations - a primary care provider would be ideal for your concern.    Please use itzbig to schedule a visit with a provider or call [phone] ([phone]) to schedule at any of our locations.    Thanks for choosing?us?as your health care partner,?   ?   Bartolo Torres NP?

## 2024-04-27 NOTE — PATIENT INSTRUCTIONS
Dear Dariana,    We are sorry you are not feeling well. Based on the responses you provided, it is recommended that you be seen in-person in clinic so we can better evaluate your symptoms. Please schedule this visit in BusyEvent. You will have a Schedule Now button in BusyEvent to help with scheduling this appointment. Otherwise, you can call [phone] to schedule an appointment.     You will not be charged for this eVisit. Thank you for trusting us with your care.     Bartolo Torres NP

## 2024-05-03 ENCOUNTER — OFFICE VISIT (OUTPATIENT)
Dept: INTERNAL MEDICINE | Facility: CLINIC | Age: 34
End: 2024-05-03
Payer: COMMERCIAL

## 2024-05-03 ENCOUNTER — LAB (OUTPATIENT)
Dept: LAB | Facility: CLINIC | Age: 34
End: 2024-05-03
Payer: COMMERCIAL

## 2024-05-03 VITALS
OXYGEN SATURATION: 100 % | DIASTOLIC BLOOD PRESSURE: 71 MMHG | WEIGHT: 150.5 LBS | SYSTOLIC BLOOD PRESSURE: 104 MMHG | HEART RATE: 74 BPM | BODY MASS INDEX: 26.67 KG/M2

## 2024-05-03 DIAGNOSIS — Z11.59 NEED FOR HEPATITIS C SCREENING TEST: ICD-10-CM

## 2024-05-03 DIAGNOSIS — L65.9 HAIR LOSS: ICD-10-CM

## 2024-05-03 DIAGNOSIS — J30.2 SEASONAL ALLERGIC RHINITIS, UNSPECIFIED TRIGGER: ICD-10-CM

## 2024-05-03 DIAGNOSIS — D64.9 ANEMIA, UNSPECIFIED TYPE: ICD-10-CM

## 2024-05-03 DIAGNOSIS — Z11.4 SCREENING FOR HIV (HUMAN IMMUNODEFICIENCY VIRUS): ICD-10-CM

## 2024-05-03 DIAGNOSIS — Z12.4 CERVICAL CANCER SCREENING: ICD-10-CM

## 2024-05-03 DIAGNOSIS — Z11.4 SCREENING FOR HIV (HUMAN IMMUNODEFICIENCY VIRUS): Primary | ICD-10-CM

## 2024-05-03 LAB
ALBUMIN SERPL BCG-MCNC: 4.4 G/DL (ref 3.5–5.2)
ALP SERPL-CCNC: 88 U/L (ref 40–150)
ALT SERPL W P-5'-P-CCNC: 31 U/L (ref 0–50)
ANION GAP SERPL CALCULATED.3IONS-SCNC: 10 MMOL/L (ref 7–15)
AST SERPL W P-5'-P-CCNC: 22 U/L (ref 0–45)
BILIRUB SERPL-MCNC: 0.3 MG/DL
BUN SERPL-MCNC: 14.4 MG/DL (ref 6–20)
CALCIUM SERPL-MCNC: 9.3 MG/DL (ref 8.6–10)
CHLORIDE SERPL-SCNC: 105 MMOL/L (ref 98–107)
CREAT SERPL-MCNC: 0.6 MG/DL (ref 0.51–0.95)
DEPRECATED HCO3 PLAS-SCNC: 25 MMOL/L (ref 22–29)
EGFRCR SERPLBLD CKD-EPI 2021: >90 ML/MIN/1.73M2
ERYTHROCYTE [DISTWIDTH] IN BLOOD BY AUTOMATED COUNT: 13.4 % (ref 10–15)
FERRITIN SERPL-MCNC: 100 NG/ML (ref 6–175)
GLUCOSE SERPL-MCNC: 100 MG/DL (ref 70–99)
HCT VFR BLD AUTO: 36.3 % (ref 35–47)
HGB BLD-MCNC: 12.2 G/DL (ref 11.7–15.7)
IRON BINDING CAPACITY (ROCHE): 333 UG/DL (ref 240–430)
IRON SATN MFR SERPL: 14 % (ref 15–46)
IRON SERPL-MCNC: 48 UG/DL (ref 37–145)
MCH RBC QN AUTO: 29 PG (ref 26.5–33)
MCHC RBC AUTO-ENTMCNC: 33.6 G/DL (ref 31.5–36.5)
MCV RBC AUTO: 86 FL (ref 78–100)
PLATELET # BLD AUTO: 214 10E3/UL (ref 150–450)
POTASSIUM SERPL-SCNC: 3.8 MMOL/L (ref 3.4–5.3)
PROT SERPL-MCNC: 7.6 G/DL (ref 6.4–8.3)
RBC # BLD AUTO: 4.21 10E6/UL (ref 3.8–5.2)
SODIUM SERPL-SCNC: 140 MMOL/L (ref 135–145)
TSH SERPL DL<=0.005 MIU/L-ACNC: 0.76 UIU/ML (ref 0.3–4.2)
WBC # BLD AUTO: 6.1 10E3/UL (ref 4–11)

## 2024-05-03 PROCEDURE — 82728 ASSAY OF FERRITIN: CPT | Performed by: PATHOLOGY

## 2024-05-03 PROCEDURE — 83550 IRON BINDING TEST: CPT | Performed by: PATHOLOGY

## 2024-05-03 PROCEDURE — 85027 COMPLETE CBC AUTOMATED: CPT | Performed by: PATHOLOGY

## 2024-05-03 PROCEDURE — 83540 ASSAY OF IRON: CPT | Performed by: PATHOLOGY

## 2024-05-03 PROCEDURE — 83020 HEMOGLOBIN ELECTROPHORESIS: CPT | Mod: 90 | Performed by: PATHOLOGY

## 2024-05-03 PROCEDURE — 84443 ASSAY THYROID STIM HORMONE: CPT | Performed by: PATHOLOGY

## 2024-05-03 PROCEDURE — 83021 HEMOGLOBIN CHROMOTOGRAPHY: CPT | Mod: 90 | Performed by: PATHOLOGY

## 2024-05-03 PROCEDURE — 87389 HIV-1 AG W/HIV-1&-2 AB AG IA: CPT | Performed by: INTERNAL MEDICINE

## 2024-05-03 PROCEDURE — 36415 COLL VENOUS BLD VENIPUNCTURE: CPT | Performed by: PATHOLOGY

## 2024-05-03 PROCEDURE — 86803 HEPATITIS C AB TEST: CPT | Performed by: INTERNAL MEDICINE

## 2024-05-03 PROCEDURE — 86038 ANTINUCLEAR ANTIBODIES: CPT | Performed by: INTERNAL MEDICINE

## 2024-05-03 PROCEDURE — 85660 RBC SICKLE CELL TEST: CPT | Mod: 90 | Performed by: PATHOLOGY

## 2024-05-03 PROCEDURE — 80053 COMPREHEN METABOLIC PANEL: CPT | Performed by: PATHOLOGY

## 2024-05-03 PROCEDURE — 99204 OFFICE O/P NEW MOD 45 MIN: CPT | Performed by: INTERNAL MEDICINE

## 2024-05-03 PROCEDURE — 99000 SPECIMEN HANDLING OFFICE-LAB: CPT | Performed by: PATHOLOGY

## 2024-05-03 RX ORDER — LANOLIN ALCOHOL/MO/W.PET/CERES
1000 CREAM (GRAM) TOPICAL
Status: CANCELLED | OUTPATIENT
Start: 2024-05-03

## 2024-05-03 RX ORDER — VITAMIN A ACETATE, .BETA.-CAROTENE, ASCORBIC ACID, CHOLECALCIFEROL, .ALPHA.-TOCOPHEROL ACETATE, DL-, THIAMINE MONONITRATE, RIBOFLAVIN, NIACINAMIDE, PYRIDOXINE HYDROCHLORIDE, FOLIC ACID, CYANOCOBALAMIN, CALCIUM CARBONATE, FERROUS FUMARATE, ZINC OXIDE, AND CUPRIC OXIDE 2000; 2000; 120; 400; 22; 1.84; 3; 20; 10; 1; 12; 200; 27; 25; 2 [IU]/1; [IU]/1; MG/1; [IU]/1; MG/1; MG/1; MG/1; MG/1; MG/1; MG/1; UG/1; MG/1; MG/1; MG/1; MG/1
1 TABLET ORAL DAILY
Status: CANCELLED | OUTPATIENT
Start: 2024-05-03

## 2024-05-03 RX ORDER — CETIRIZINE HYDROCHLORIDE 10 MG/1
10 TABLET ORAL DAILY
Qty: 90 TABLET | Refills: 3 | Status: SHIPPED | OUTPATIENT
Start: 2024-05-03

## 2024-05-03 RX ORDER — LANOLIN ALCOHOL/MO/W.PET/CERES
1 CREAM (GRAM) TOPICAL
COMMUNITY
Start: 2023-08-01

## 2024-05-03 RX ORDER — MULTIVIT WITH MINERALS/LUTEIN
250 TABLET ORAL DAILY
Qty: 90 TABLET | Refills: 0 | Status: SHIPPED | OUTPATIENT
Start: 2024-05-03

## 2024-05-04 LAB
HCV AB SERPL QL IA: NONREACTIVE
HGB A1 MFR BLD: 89.9 %
HGB A2 MFR BLD: 3.2 %
HGB C MFR BLD: 0 %
HGB E MFR BLD: 0 %
HGB F MFR BLD: 6.9 %
HGB FRACT BLD ELPH-IMP: ABNORMAL
HGB OTHER MFR BLD: 0 %
HGB S BLD QL SOLY: ABNORMAL
HGB S MFR BLD: 0 %
HIV 1+2 AB+HIV1 P24 AG SERPL QL IA: NONREACTIVE
PATH INTERP BLD-IMP: ABNORMAL

## 2024-05-06 LAB — ANA SER QL IF: NEGATIVE

## 2024-05-24 ENCOUNTER — HOSPITAL ENCOUNTER (EMERGENCY)
Facility: CLINIC | Age: 34
Discharge: HOME | End: 2024-05-24
Attending: EMERGENCY MEDICINE | Admitting: EMERGENCY MEDICINE
Payer: COMMERCIAL

## 2024-05-24 VITALS
DIASTOLIC BLOOD PRESSURE: 72 MMHG | HEIGHT: 63 IN | WEIGHT: 153.4 LBS | SYSTOLIC BLOOD PRESSURE: 111 MMHG | BODY MASS INDEX: 27.18 KG/M2 | TEMPERATURE: 99.1 F | HEART RATE: 80 BPM | OXYGEN SATURATION: 100 % | RESPIRATION RATE: 16 BRPM

## 2024-05-24 DIAGNOSIS — J02.9 VIRAL PHARYNGITIS: ICD-10-CM

## 2024-05-24 LAB — GROUP A STREP BY PCR: NOT DETECTED

## 2024-05-24 PROCEDURE — 87651 STREP A DNA AMP PROBE: CPT | Performed by: EMERGENCY MEDICINE

## 2024-05-24 PROCEDURE — 99284 EMERGENCY DEPT VISIT MOD MDM: CPT | Performed by: EMERGENCY MEDICINE

## 2024-05-24 PROCEDURE — 99283 EMERGENCY DEPT VISIT LOW MDM: CPT | Performed by: EMERGENCY MEDICINE

## 2024-05-24 PROCEDURE — 250N000013 HC RX MED GY IP 250 OP 250 PS 637: Performed by: EMERGENCY MEDICINE

## 2024-05-24 RX ORDER — MAGNESIUM HYDROXIDE/ALUMINUM HYDROXICE/SIMETHICONE 120; 1200; 1200 MG/30ML; MG/30ML; MG/30ML
30 SUSPENSION ORAL ONCE
Status: COMPLETED | OUTPATIENT
Start: 2024-05-24 | End: 2024-05-24

## 2024-05-24 RX ORDER — BENZONATATE 100 MG/1
100 CAPSULE ORAL 3 TIMES DAILY PRN
Qty: 20 CAPSULE | Refills: 0 | Status: SHIPPED | OUTPATIENT
Start: 2024-05-24 | End: 2024-07-16

## 2024-05-24 RX ADMIN — ALUMINUM HYDROXIDE, MAGNESIUM HYDROXIDE, AND SIMETHICONE 30 ML: 1200; 120; 1200 SUSPENSION ORAL at 02:03

## 2024-05-24 ASSESSMENT — ACTIVITIES OF DAILY LIVING (ADL)
ADLS_ACUITY_SCORE: 35
ADLS_ACUITY_SCORE: 33

## 2024-05-24 ASSESSMENT — COLUMBIA-SUICIDE SEVERITY RATING SCALE - C-SSRS
1. IN THE PAST MONTH, HAVE YOU WISHED YOU WERE DEAD OR WISHED YOU COULD GO TO SLEEP AND NOT WAKE UP?: NO
2. HAVE YOU ACTUALLY HAD ANY THOUGHTS OF KILLING YOURSELF IN THE PAST MONTH?: NO
6. HAVE YOU EVER DONE ANYTHING, STARTED TO DO ANYTHING, OR PREPARED TO DO ANYTHING TO END YOUR LIFE?: NO

## 2024-05-24 NOTE — DISCHARGE INSTRUCTIONS
Instructions from your doctor today:  Emergency Department (ED) testing is focused on the potential causes of your symptoms that are the most dangerous possibilities, and cannot cover every possibility. Based on the evaluation, it was deemed sufficiently safe to discharge and continue management through the clinics. Thus, follow-up is very important to assess for improvement/worsening, potential further testing, and potential treatment adjustments. If you were given opioid pain medications or other medications that can make you drowsy while in the ED, you should not drive for at least several hours and not until you feel completely back to normal.     Please make an appointment to follow up with:  - Your Primary Care Provider in 5-7 days  - If you do not have a primary care provider, you can be seen in follow-up and establish care by calling any of the clinics below:     - [facility] (phone: [phone])     - Primary Care / [facility] (phone: [phone])   - Have your clinic provider review the results from today's visit with you again, including any potential follow-up or additional testing that may be needed based on the results. Occasionally, incidental findings are found on later review by radiologists that may need follow-up.     Return to the Emergency Department immediately if you have worsening symptoms, difficulty breathing or swallowing, or any other urgent health concerns.

## 2024-05-24 NOTE — ED PROVIDER NOTES
"  History     Chief Complaint   Patient presents with    Pharyngitis     Pt feels like something is stuck in right side of throat       HPI  Dariana Castellon is a 34 year old female without pertinent PMH who presents to the ED with throat discomfort.  Symptoms for 1 week.  Patient points to anterior neck midline and slightly on right side as location, does seem to somewhat move.  No difficulty swallowing, no vomiting, no drooling.  No other areas of pain.     Physical Exam   BP: 111/72  Pulse: 80  Temp: 99.1  F (37.3  C)  Resp: 16  Height: 160 cm (5' 3\")  Weight: 69.6 kg (153 lb 6.4 oz)  SpO2: 100 %    Physical Exam  General: no acute distress. Appears stated age.   HENT: MMM, scattered vesicles on posterior oropharynx, midline uvula, no tonsillar hypertrophy or exudate.  Mild cervical lymphadenopathy, no pain with tracheal manipulation  Eyes: PERRL, normal sclerae   Cardio: Regular rate. Regular rhythm. Extremities well perfused  Resp: Normal work of breathing, Normal respiratory rate.   Neuro: alert without signs of confusion. CN II-XII grossly intact. Grossly normal strength and sensation in all extremities.   Psych: normal affect, normal behavior      ED Course      Procedures              Labs Ordered and Resulted from Time of ED Arrival to Time of ED Departure   GROUP A STREPTOCOCCUS PCR THROAT SWAB - Normal       Result Value    Group A strep by PCR Not Detected       No orders to display        Medical Decision Making  The patient's presentation was of low complexity (an acute and uncomplicated illness or injury).    The patient's evaluation involved:  review of 3+ test result(s) ordered prior to this encounter (see separate area of note for details)  ordering and/or review of 1 test(s) in this encounter (see separate area of note for details)    The patient's management necessitated moderate risk (prescription drug management including medications given in the ED).      Assessments & Plan   Patient " presenting with throat pain. Vitals in the ED unremarkable. Nursing notes reviewed.  Exam notable for some scattered vesicles in the posterior oropharynx and mild cervical lymphadenopathy.    Strep swab negative.  No clinical evidence of deep neck space infection.  Patient had recent labs in clinic on 5/3/2024 with normal thyroid studies, normal WBC.    In the ED, the patient's symptoms were managed with Maalox, without improvement in symptoms upon reassessment.  Unlikely gastric reflux related.    The complete clinical picture is most consistent with viral pharyngitis. After counseling on the diagnosis, work-up, and treatment plan, the patient was discharged to home. The patient was advised to follow-up with primary care in about a week. The patient was advised to return to the ED if worsening symptoms, difficulty swallowing or breathing, or any urgent health concerns.     Final diagnoses:   Viral pharyngitis     New Prescriptions    BENZONATATE (TESSALON) 100 MG CAPSULE    Take 1 capsule (100 mg) by mouth 3 times daily as needed (cough or throat pain)    NAPROXEN (NAPROSYN) 375 MG TABLET    Take 1 tablet (375 mg) by mouth 2 times daily as needed for moderate pain Take with meals.     --  Hiro Castaneda MD   Emergency Medicine   [facility] EMERGENCY DEPARTMENT  5/24/2024       Hiro Castaneda MD  05/24/24 7654

## 2024-05-24 NOTE — ED TRIAGE NOTES
Pt reports that it feels like something is stuck in her throat for a week but it has not gone away and could not take it one more night.      Triage Assessment (Adult)       Row Name 05/24/24 0050          Triage Assessment    Airway WDL WDL        Respiratory WDL    Respiratory WDL WDL        Skin Circulation/Temperature WDL    Skin Circulation/Temperature WDL WDL        Cardiac WDL    Cardiac WDL WDL        Peripheral/Neurovascular WDL    Peripheral Neurovascular WDL WDL        Cognitive/Neuro/Behavioral WDL    Cognitive/Neuro/Behavioral WDL WDL

## 2024-05-24 NOTE — ED NOTES
Pt refusing throat swab at this time, stating she feels like something is lodged in her throat, unsure of what it could be. Denies trouble breathing or swallowing.

## 2024-06-27 ENCOUNTER — OFFICE VISIT (OUTPATIENT)
Dept: DERMATOLOGY | Facility: CLINIC | Age: 34
End: 2024-06-27
Payer: COMMERCIAL

## 2024-06-27 DIAGNOSIS — E60 ZINC DEFICIENCY: ICD-10-CM

## 2024-06-27 DIAGNOSIS — L64.9 ANDROGENIC ALOPECIA: ICD-10-CM

## 2024-06-27 DIAGNOSIS — L65.9 LOSS OF HAIR: Primary | ICD-10-CM

## 2024-06-27 LAB — VIT D+METAB SERPL-MCNC: 29 NG/ML (ref 20–50)

## 2024-06-27 PROCEDURE — 84630 ASSAY OF ZINC: CPT | Mod: 90 | Performed by: DERMATOLOGY

## 2024-06-27 PROCEDURE — 99000 SPECIMEN HANDLING OFFICE-LAB: CPT | Performed by: DERMATOLOGY

## 2024-06-27 PROCEDURE — 36415 COLL VENOUS BLD VENIPUNCTURE: CPT | Performed by: DERMATOLOGY

## 2024-06-27 PROCEDURE — 82306 VITAMIN D 25 HYDROXY: CPT | Performed by: DERMATOLOGY

## 2024-06-27 PROCEDURE — 99204 OFFICE O/P NEW MOD 45 MIN: CPT | Performed by: DERMATOLOGY

## 2024-06-27 RX ORDER — MULTIPLE VITAMINS W/ MINERALS TAB 9MG-400MCG
1 TAB ORAL DAILY
COMMUNITY

## 2024-06-27 NOTE — PATIENT INSTRUCTIONS
Topical Rogaine (Minoxidil) for Pattern Hair Loss    Minoxidil is an FDA approved over the counter topical for the treatment of hair loss and thinning hair in men and women.   Initially a 2% solution was available however this required application twice daily. A 5% solution is also now approved, only requiring application once per day.     Available Products:   Rogaine 5% solution: Packaged for men however can be used by men or women. Use dropper and apply directly to scalp at bedtime. This product can cause an allergy because of presence of propylene glycol. Stop this product if you develop a rash or itching and contact your physician.   Rogaine 5% foam: Packaged for men and women: Apply foam directly to the scalp once daily. This is less greasy compared to the solution. This formula is preferred for those who had a reaction to the solution product. If you develop rash or itching, stop the product and contact your physician.     What if I stop minoxidil topical?   After stopping minoxidil the hair will return to the usual pattern of thinning. Using the product 3-4 times per week is better than not using product at all.       Can I use generic minoxidil?   Yes, look for 5% minoxidil.     What are the side effects?  The most common side effect is rash or itching of the scalp. This can occur if a contact allergy develops with propylene glycol. A small group of patients noticed the appearance of facial hair if the product runs onto the face or with prolonged use. Keep it away from the face.  This can cause temporary shedding. Please, call us if this happens.  This can irritated the scalp. Please, call us if this happens.  Stop this product if you become pregnant or are breastfeeding      Last updated: 11/2/2021        Hair Max laser comb also for the hair     Can consider tattooing of the scalp-   Dr. [facility]  1. Hair Transplantation  [email or web address]  Phone: [phone] [address]  , [address],  [place] [address]    2. Micropigmentation  -Done by [facility]

## 2024-06-27 NOTE — NURSING NOTE
Dariana Castellon's goals for this visit include:   Chief Complaint   Patient presents with    Hair Loss     Pt is here for , always had thin hair, has been getting worse for about the last year. Experiencing receeding hair and it is thinning.        She requests these members of her care team be copied on today's visit information:     PCP: No Ref-Primary, Physician    Referring Provider:  Luh Arreola MD  [address] [place] [id number]    [facility] 03/06/2024     Do you need any medication refills at today's visit?     Geeta Kyle LPN on 6/27/2024 at 2:03 PM

## 2024-06-27 NOTE — LETTER
6/27/2024      Dariana Castellon  [address]  [place] [address]      Dear Colleague,    Thank you for referring your patient, Dariana Castellon, to the [facility]. Please see a copy of my visit note below.      [facility] Dermatology Note  Encounter Date: Jun 27, 2024  Office Visit     Dermatology Problem List:  1. Nonscarring alopecia  2. Verruca vulgaris  - bilateral hands, s/p outside cryo ([facility])  3. Mild iron deficiency anemia    ____________________________________________    Assessment & Plan:    # Hair loss, c/w severe AGA with telogen effluvium, likely due to last pregnancy. Last birth 2/2023. Not breastfeeding. TE portion is resolved on women's rogaine. Will increase strength.   - Reviewed last TSH w/ Free T4, CMP, Iron and TBIC, Ferritin, CBC from 5/3/24  - Recommended increasing topical minoxidil from 2% to 5% nightly. Reviewed will require at least 4 months of consistent use to start seeing results.   - Recommended HairMax laser comb as option  - Reviewed scalp tattooing option.   - Reviewed risk of hair breakage with extensions. Keep extension away from scalp and loose if choosing to use.   - Reviewed option of biopsy if not responding to treatment.   - Labs ordered: Zinc, vitamin d (1000 international unit(s) daily)  -future: PRP (not FDA approved for  hair loss and reviewed), folix laser, oral minoxidil and oral spironolactone    # Mild iron deficiency anemia  - Last Ferritin was 100         Procedures Performed:   none    Follow-up: 6 month(s) in-person, or earlier for new or changing lesions    Staff and Scribe:     Scribe Disclosure:   I, Irma Valadez, am serving as a scribe to document services personally performed by Kathe Montilla MD based on data collection and the provider's statements to me.       Provider Disclosure:   The documentation recorded by the scribe accurately reflects the services I personally performed and  the decisions made by me.    Kathe Montilla MD    Department of Dermatology  [facility]: Phone: [phone], Fax:[phone]  [facility]: Phone: [phone], Fax: [phone]   ____________________________________________    CC: Hair Loss (Pt is here for HL, always had thin hair, has been getting worse for about the last year. Experiencing receeding hair and it is thinning. )    HPI:  Ms. Dariana Castellon is a(n) 34 year old female who presents today as a new patient for hair loss.    Referred by Dr Luh Arreola for Hair loss. HAs had for years but flared after baby 2/2023 and now stopped shedding wth 2 months of topical minoxidil    Today, patient reports she has always had thin hair, worsened with last pregnancy and it has been getting significantly thinner for the last year. Washes 1-2 times weekly. Uses rosemary products and 2% minoxidil ([id number]). Does use hair chemicals. Has Nexplanon. Family history of baldness. History of childbirth in last 3-12 months, February 2023. Is not breastfeeding No special diets. No surgeries. Never seen dermatology.          Patient is otherwise feeling well, without additional skin concerns.    Labs Reviewed:  Component      Latest Ref Rng 5/3/2024  3:10 PM   Sodium      135 - 145 mmol/L 140    Potassium      3.4 - 5.3 mmol/L 3.8    Carbon Dioxide (CO2)      22 - 29 mmol/L 25    Anion Gap      7 - 15 mmol/L 10    Urea Nitrogen      6.0 - 20.0 mg/dL 14.4    Creatinine      0.51 - 0.95 mg/dL 0.60    GFR Estimate      >60 mL/min/1.73m2 >90    Calcium      8.6 - 10.0 mg/dL 9.3    Chloride      98 - 107 mmol/L 105    Glucose      70 - 99 mg/dL 100 (H)    Alkaline Phosphatase      40 - 150 U/L 88    AST      0 - 45 U/L 22    ALT      0 - 50 U/L 31    Protein Total      6.4 - 8.3 g/dL 7.6    Albumin      3.5 - 5.2 g/dL 4.4    Bilirubin Total      <=1.2 mg/dL 0.3     Hemoglobin A      95.0 - 97.9 % 89.9 (L)    Hemoglobin A2      2.0 - 3.5 % 3.2    Hemoglobin F      0.0 - 2.1 % 6.9 (H)    Hemoglobin S      0.0 - 0.0 % 0.0    Hemoglobin C      0.0 - 0.0 % 0.0    Hemoglobin E      0.0 - 0.0 % 0.0    Hemoglobin Other      0.0 - 0.0 % 0.0    Hemoglobin Evaluation by CE See Note    Sickle Cell Solubility Reflex Not Performed    Hgb Capillary Electrophoresis Reflex Not Performed    WBC      4.0 - 11.0 10e3/uL 6.1    RBC Count      3.80 - 5.20 10e6/uL 4.21    Hemoglobin      11.7 - 15.7 g/dL 12.2    Hematocrit      35.0 - 47.0 % 36.3    MCV      78 - 100 fL 86    MCH      26.5 - 33.0 pg 29.0    MCHC      31.5 - 36.5 g/dL 33.6    RDW      10.0 - 15.0 % 13.4    Platelet Count      150 - 450 10e3/uL 214    Iron      37 - 145 ug/dL 48    Iron Binding Capacity      240 - 430 ug/dL 333    Iron Sat Index      15 - 46 % 14 (L)    Ferritin      6 - 175 ng/mL 100    TSH      0.30 - 4.20 uIU/mL 0.76    MELIZA interpretation      Negative  Negative       Legend:  (H) High  (L) Low    Physical Exam:  Vitals: LMP 03/06/2024   SKIN: Focused examination of scalp was performed.  - Ras is 3.  - Frontal hair line is general intact  - Diffuse thinning but worse on vertex and crown.  - Hair pull is negative  - Miniaturization of hair follicles.  - No other lesions of concern on areas examined.     Medications:  Current Outpatient Medications   Medication Sig Dispense Refill     cetirizine (ZYRTEC) 10 MG tablet Take 1 tablet (10 mg) by mouth daily 90 tablet 3     multivitamin w/minerals (MULTI-VITAMIN) tablet Take 1 tablet by mouth daily       Benzocaine-Menthol (CEPACOL) 15-2.3 MG LOZG Take 1 tablet by mouth 3 times daily as needed (sore throat) (Patient not taking: Reported on 2/20/2023) 16 lozenge 0     benzonatate (TESSALON) 100 MG capsule Take 1 capsule (100 mg) by mouth 3 times daily as needed (cough or throat pain) (Patient not taking: Reported on 6/27/2024) 20 capsule 0     cyanocobalamin (VITAMIN  B-12) 1000 MCG tablet Take 1 tablet by mouth daily at 2 pm (Patient not taking: Reported on 6/27/2024)       naproxen (NAPROSYN) 375 MG tablet Take 1 tablet (375 mg) by mouth 2 times daily as needed for moderate pain Take with meals. (Patient not taking: Reported on 6/27/2024) 30 tablet 0     oseltamivir (TAMIFLU) 75 MG capsule Take 1 capsule (75 mg) by mouth 2 times daily (Patient not taking: Reported on 2/1/2024) 10 capsule 0     Prenatal Vit-Fe Fumarate-FA (PNV PRENATAL PLUS MULTIVITAMIN) 27-1 MG TABS per tablet Take 1 tablet by mouth daily (Patient not taking: Reported on 6/27/2024)       vitamin C (ASCORBIC ACID) 250 MG tablet Take 1 tablet (250 mg) by mouth daily (Patient not taking: Reported on 6/27/2024) 90 tablet 0     No current facility-administered medications for this visit.      Past Medical History:   Patient Active Problem List   Diagnosis     Abnormal thyroid blood test     Positive HSV 1 IgG     High blood hemoglobin F (H24)     Gestational thrombocytopenia (H24)     Encounter for postpartum care of lactating mother     Nexplanon in place     Past Medical History:   Diagnosis Date     Hx of maternal laceration, 3rd degree, currently pregnant 12/14/2022        CC No referring provider defined for this encounter. on close of this encounter.      Again, thank you for allowing me to participate in the care of your patient.        Sincerely,        Kathe Montilla MD

## 2024-06-27 NOTE — PROGRESS NOTES
[facility] Dermatology Note  Encounter Date: Jun 27, 2024  Office Visit     Dermatology Problem List:  1. Nonscarring alopecia  2. Verruca vulgaris  - bilateral hands, s/p outside cryo ([facility])  3. Mild iron deficiency anemia    ____________________________________________    Assessment & Plan:    # Hair loss, c/w severe AGA with telogen effluvium, likely due to last pregnancy. Last birth 2/2023. Not breastfeeding. TE portion is resolved on women's rogaine. Will increase strength.   - Reviewed last TSH w/ Free T4, CMP, Iron and TBIC, Ferritin, CBC from 5/3/24  - Recommended increasing topical minoxidil from 2% to 5% nightly. Reviewed will require at least 4 months of consistent use to start seeing results.   - Recommended HairMax laser comb as option  - Reviewed scalp tattooing option.   - Reviewed risk of hair breakage with extensions. Keep extension away from scalp and loose if choosing to use.   - Reviewed option of biopsy if not responding to treatment.   - Labs ordered: Zinc, vitamin d (1000 international unit(s) daily)  -future: PRP (not FDA approved for  hair loss and reviewed), folix laser, oral minoxidil and oral spironolactone    # Mild iron deficiency anemia  - Last Ferritin was 100         Procedures Performed:   none    Follow-up: 6 month(s) in-person, or earlier for new or changing lesions    Staff and Scribe:     Scribe Disclosure:   I, Irma Valadez, am serving as a scribe to document services personally performed by Kathe Montilla MD based on data collection and the provider's statements to me.       Provider Disclosure:   The documentation recorded by the scribe accurately reflects the services I personally performed and the decisions made by me.    Kathe Montilla MD    Department of Dermatology  [facility]: Phone: [phone], Fax:[phone]  [facility]: Phone: [phone], Fax: [phone]   ____________________________________________    CC: Hair Loss (Pt is here for HL, always had thin hair, has been getting worse for about the last year. Experiencing receeding hair and it is thinning. )    HPI:  Ms. Dariana Castellon is a(n) 34 year old female who presents today as a new patient for hair loss.    Referred by Dr Luh Arreola for Hair loss. HAs had for years but flared after baby 2/2023 and now stopped shedding wth 2 months of topical minoxidil    Today, patient reports she has always had thin hair, worsened with last pregnancy and it has been getting significantly thinner for the last year. Washes 1-2 times weekly. Uses rosemary products and 2% minoxidil ([id number]). Does use hair chemicals. Has Nexplanon. Family history of baldness. History of childbirth in last 3-12 months, February 2023. Is not breastfeeding No special diets. No surgeries. Never seen dermatology.          Patient is otherwise feeling well, without additional skin concerns.    Labs Reviewed:  Component      Latest Ref Rng 5/3/2024  3:10 PM   Sodium      135 - 145 mmol/L 140    Potassium      3.4 - 5.3 mmol/L 3.8    Carbon Dioxide (CO2)      22 - 29 mmol/L 25    Anion Gap      7 - 15 mmol/L 10    Urea Nitrogen      6.0 - 20.0 mg/dL 14.4    Creatinine      0.51 - 0.95 mg/dL 0.60    GFR Estimate      >60 mL/min/1.73m2 >90    Calcium      8.6 - 10.0 mg/dL 9.3    Chloride      98 - 107 mmol/L 105    Glucose      70 - 99 mg/dL 100 (H)    Alkaline Phosphatase      40 - 150 U/L 88    AST      0 - 45 U/L 22    ALT      0 - 50 U/L 31    Protein Total      6.4 - 8.3 g/dL 7.6    Albumin      3.5 - 5.2 g/dL 4.4    Bilirubin Total      <=1.2 mg/dL 0.3    Hemoglobin A      95.0 - 97.9 % 89.9 (L)    Hemoglobin A2      2.0 - 3.5 % 3.2    Hemoglobin F      0.0 - 2.1 % 6.9 (H)    Hemoglobin S      0.0 - 0.0 % 0.0    Hemoglobin C      0.0 - 0.0 % 0.0    Hemoglobin E      0.0 - 0.0 % 0.0    Hemoglobin  Other      0.0 - 0.0 % 0.0    Hemoglobin Evaluation by CE See Note    Sickle Cell Solubility Reflex Not Performed    Hgb Capillary Electrophoresis Reflex Not Performed    WBC      4.0 - 11.0 10e3/uL 6.1    RBC Count      3.80 - 5.20 10e6/uL 4.21    Hemoglobin      11.7 - 15.7 g/dL 12.2    Hematocrit      35.0 - 47.0 % 36.3    MCV      78 - 100 fL 86    MCH      26.5 - 33.0 pg 29.0    MCHC      31.5 - 36.5 g/dL 33.6    RDW      10.0 - 15.0 % 13.4    Platelet Count      150 - 450 10e3/uL 214    Iron      37 - 145 ug/dL 48    Iron Binding Capacity      240 - 430 ug/dL 333    Iron Sat Index      15 - 46 % 14 (L)    Ferritin      6 - 175 ng/mL 100    TSH      0.30 - 4.20 uIU/mL 0.76    MELIZA interpretation      Negative  Negative       Legend:  (H) High  (L) Low    Physical Exam:  Vitals: LMP 03/06/2024   SKIN: Focused examination of scalp was performed.  - Ras is 3.  - Frontal hair line is general intact  - Diffuse thinning but worse on vertex and crown.  - Hair pull is negative  - Miniaturization of hair follicles.  - No other lesions of concern on areas examined.     Medications:  Current Outpatient Medications   Medication Sig Dispense Refill    cetirizine (ZYRTEC) 10 MG tablet Take 1 tablet (10 mg) by mouth daily 90 tablet 3    multivitamin w/minerals (MULTI-VITAMIN) tablet Take 1 tablet by mouth daily      Benzocaine-Menthol (CEPACOL) 15-2.3 MG LOZG Take 1 tablet by mouth 3 times daily as needed (sore throat) (Patient not taking: Reported on 2/20/2023) 16 lozenge 0    benzonatate (TESSALON) 100 MG capsule Take 1 capsule (100 mg) by mouth 3 times daily as needed (cough or throat pain) (Patient not taking: Reported on 6/27/2024) 20 capsule 0    cyanocobalamin (VITAMIN B-12) 1000 MCG tablet Take 1 tablet by mouth daily at 2 pm (Patient not taking: Reported on 6/27/2024)      naproxen (NAPROSYN) 375 MG tablet Take 1 tablet (375 mg) by mouth 2 times daily as needed for moderate pain Take with meals. (Patient not  taking: Reported on 6/27/2024) 30 tablet 0    oseltamivir (TAMIFLU) 75 MG capsule Take 1 capsule (75 mg) by mouth 2 times daily (Patient not taking: Reported on 2/1/2024) 10 capsule 0    Prenatal Vit-Fe Fumarate-FA (PNV PRENATAL PLUS MULTIVITAMIN) 27-1 MG TABS per tablet Take 1 tablet by mouth daily (Patient not taking: Reported on 6/27/2024)      vitamin C (ASCORBIC ACID) 250 MG tablet Take 1 tablet (250 mg) by mouth daily (Patient not taking: Reported on 6/27/2024) 90 tablet 0     No current facility-administered medications for this visit.      Past Medical History:   Patient Active Problem List   Diagnosis    Abnormal thyroid blood test    Positive HSV 1 IgG    High blood hemoglobin F (H24)    Gestational thrombocytopenia (H24)    Encounter for postpartum care of lactating mother    Nexplanon in place     Past Medical History:   Diagnosis Date    Hx of maternal laceration, 3rd degree, currently pregnant 12/14/2022        CC No referring provider defined for this encounter. on close of this encounter.

## 2024-06-30 LAB — ZINC SERPL-MCNC: 50.8 UG/DL

## 2024-07-01 ENCOUNTER — TELEPHONE (OUTPATIENT)
Dept: DERMATOLOGY | Facility: CLINIC | Age: 34
End: 2024-07-01
Payer: COMMERCIAL

## 2024-07-01 DIAGNOSIS — E55.9 VITAMIN D DEFICIENCY: Primary | ICD-10-CM

## 2024-07-01 RX ORDER — ZINC SULFATE 50(220)MG
CAPSULE ORAL
Qty: 180 CAPSULE | Refills: 0 | Status: SHIPPED | OUTPATIENT
Start: 2024-07-01 | End: 2024-07-17

## 2024-07-01 NOTE — TELEPHONE ENCOUNTER
[facility] Center    Phone Message    May a detailed message be left on voicemail: yes     Reason for Call: Other: vitamin D 2000IU      Action Taken: Other: [facility]    Travel Screening: Not Applicable     Date of Service:

## 2024-07-02 RX ORDER — CHOLECALCIFEROL (VITAMIN D3) 50 MCG
TABLET ORAL
Qty: 60 TABLET | Refills: 0 | Status: SHIPPED | OUTPATIENT
Start: 2024-07-02

## 2024-07-15 NOTE — PROGRESS NOTES
PRIMARY CARE CENTER       SUBJECTIVE:  Dariana Castellon is a 34 year old female with no sig PMHx who comes in for dysphagia    She was seen in ED 5/2024 for difficulty swallowing. She had no drooling, was afebrile. Per ED note she was having some throat pain at the time as well. She was neg for Group A strep. Had recent normal WBC and normal TFTs. She was given antacids without much improvement. Suspected to have viral pharyngitis and discharged home    She has a R sided toothache, so she started eating using her L side. Afterwards, she started feeling like she was getting food stuck in her throat but it was only minimally bothersome so she did not think much of it. It improved slightly but then came back. When she eats something harder like meat, she feels it gets stuck. This is was ultimately drove her to the ED in May 2024. Her sxs comes and go. She has no pain, just discomfort. She feels the L side of her throat is fine, but the R side is where things get stuck. Mostly discomfort with harder foods, but not liquids. No drooling, difficulty breathing, no fevers. No N/V, no bad breath. Appetite unchanged. No issues initiating a swallow. The more she thinks about it the more she notices it. Not worse at any point in the day. Endorses occassional heartburn but says it rarely happens. Takes occassional Mg supplement and occassional Zyrtec, otherwise no medications. Denies smoking, alcohol drinking. Denies post-nasal drip. Seasonal allergies that have been acting up recently with sneezing, itching, watery eyes. No rashes, no diarrhea. No unintentional weight changes, no hot/cold intolerance, no palpitations    Her R tooth hurts. She saw a dentist who referred her to a specialist but she said they did not  the phone so she is planning to walk into their office to make the appt.       ROS:   As above in HPI    OBJECTIVE:    /72 (BP Location: Right arm, Patient Position: Sitting, Cuff  "Size: Adult Regular)   Pulse 80   Temp 98.5  F (36.9  C) (Oral)   Resp 14   Ht 1.6 m (5' 3\")   Wt 68.9 kg (151 lb 14.4 oz)   LMP 03/06/2024   SpO2 99%   Breastfeeding No   BMI 26.91 kg/m     Wt Readings from Last 1 Encounters:   07/16/24 68.9 kg (151 lb 14.4 oz)       Physical Exam  GEN: alert, comfortable, NAD  HEENT: EOMI,  anicteric sclera, MMM, no obvious tonsillar hypertrophy, no oropharyngeal erythema, uvula midline. Seemingly enlarged thyroid, no obvious nodules on palpation  CV: RRR, normal S1 S2, no m/g/r  RESP: lungs clear to auscultation - no rales, rhonchi or wheezes  SKIN: no suspicious lesions or rashes on exposed skin  NEURO: Alert and oriented, moving all limbs spontaneously, mentation intact and speech normal  PSYCH: Appropriate mood and affect to match       ASSESSMENT/PLAN:    Dariana Castellon is a 34 year old female with no sig PMHx who comes in for dysphagia    #Dysphagia  Symptom onset 2 months prior. Dysphagia with harder to chew solids, less so with liquids. No pain with swallowing. Feels food gets stuck in her upper esophagus. Does endorse occasional heartburn. No prior h/o dysphagia. With goiter on exam. Possible she has enlarged thyroid causing dysphagia, she did have normal TSH in 5/2024 and does not appear to have any hyper/hypothyroid sxs currently. [facility]. Ddx includes dysphagia 2/2 goiter, GERD, diverticulum, stricture  - Will check TPO Ab and thyroid US. If thyroid US unremarkable, will pursue esophagram. Ultimately if needing further workup can pursue EGD  - Trial two weeks of pantoprazole, plan to discontinue if no improvement in sxs  - Encouraged pt to follow up with her dentist    Pt should return to clinic for f/u with me in 5 weeks     Pt was seen and plan of care discussed with Dr Tish Dwyer, PGY3  Jul 16, 2024    Attending Addendum:  Patient seen and examined with resident in clinic today.  Pertinent portions of history and exam were " independently verified by myself.  I agree with the exam and plan as outlined above with the following modifications: none.  Daly Winston MD  Internal Medicine

## 2024-07-16 ENCOUNTER — LAB (OUTPATIENT)
Dept: LAB | Facility: CLINIC | Age: 34
End: 2024-07-16
Payer: COMMERCIAL

## 2024-07-16 ENCOUNTER — OFFICE VISIT (OUTPATIENT)
Dept: INTERNAL MEDICINE | Facility: CLINIC | Age: 34
End: 2024-07-16
Payer: COMMERCIAL

## 2024-07-16 VITALS
HEIGHT: 63 IN | RESPIRATION RATE: 14 BRPM | HEART RATE: 80 BPM | TEMPERATURE: 98.5 F | OXYGEN SATURATION: 99 % | WEIGHT: 151.9 LBS | DIASTOLIC BLOOD PRESSURE: 72 MMHG | BODY MASS INDEX: 26.91 KG/M2 | SYSTOLIC BLOOD PRESSURE: 104 MMHG

## 2024-07-16 DIAGNOSIS — Z12.4 CERVICAL CANCER SCREENING: Primary | ICD-10-CM

## 2024-07-16 DIAGNOSIS — R13.10 DYSPHAGIA, UNSPECIFIED TYPE: ICD-10-CM

## 2024-07-16 PROCEDURE — 36415 COLL VENOUS BLD VENIPUNCTURE: CPT | Performed by: PATHOLOGY

## 2024-07-16 PROCEDURE — 99000 SPECIMEN HANDLING OFFICE-LAB: CPT | Performed by: PATHOLOGY

## 2024-07-16 PROCEDURE — 99214 OFFICE O/P EST MOD 30 MIN: CPT | Mod: GC

## 2024-07-16 PROCEDURE — 86376 MICROSOMAL ANTIBODY EACH: CPT

## 2024-07-16 RX ORDER — PANTOPRAZOLE SODIUM 40 MG/1
40 TABLET, DELAYED RELEASE ORAL DAILY
Qty: 28 TABLET | Refills: 0 | Status: SHIPPED | OUTPATIENT
Start: 2024-07-16

## 2024-07-16 RX ORDER — PANTOPRAZOLE SODIUM 40 MG/1
40 TABLET, DELAYED RELEASE ORAL DAILY
Qty: 28 TABLET | Refills: 0 | Status: SHIPPED | OUTPATIENT
Start: 2024-07-16 | End: 2024-07-16

## 2024-07-16 NOTE — PATIENT INSTRUCTIONS
We will start with a thyroid ultrasound to look at the size of your thyroid, if it is enlarged this can sometimes cause trouble with swallowing. We will also prescribe you a medication called pantoprazole which can reduce stomach acid inflammation and might help with your swallowing issues. If you notice no change after taking this medication for two weeks, you can stop taking the medication. Please follow up with your dentist. We will see you back in ~ five weeks

## 2024-07-17 ENCOUNTER — TELEPHONE (OUTPATIENT)
Dept: DERMATOLOGY | Facility: CLINIC | Age: 34
End: 2024-07-17
Payer: COMMERCIAL

## 2024-07-17 DIAGNOSIS — E60 ZINC DEFICIENCY: ICD-10-CM

## 2024-07-17 LAB — THYROPEROXIDASE AB SERPL-ACNC: 22 IU/ML

## 2024-07-17 RX ORDER — ZINC SULFATE 50(220)MG
CAPSULE ORAL
Qty: 180 CAPSULE | Refills: 0 | Status: SHIPPED | OUTPATIENT
Start: 2024-07-17

## 2024-07-17 NOTE — TELEPHONE ENCOUNTER
Medication sent to correct pharmacy.   Will send patient a mychart as well to update her.     Brittnee Nunez RN on 7/17/2024 at 11:53 AM

## 2024-07-17 NOTE — TELEPHONE ENCOUNTER
[facility] Call Center    Phone Message    May a detailed message be left on voicemail: no     Reason for Call: Other: Patient is wondering if she can request both of her medications to be sent to [facility] [address]. Please follow up with patient when medication is sent.     Action Taken: Other: [facility]    Travel Screening: Not Applicable

## 2024-07-17 NOTE — PROGRESS NOTES
Medication resent to correct pharamacy. Will contact patient as well.       Brittnee Nunez RN on 7/17/2024 at 11:51 AM

## 2024-07-30 ENCOUNTER — ANCILLARY PROCEDURE (OUTPATIENT)
Dept: ULTRASOUND IMAGING | Facility: CLINIC | Age: 34
End: 2024-07-30
Attending: INTERNAL MEDICINE
Payer: COMMERCIAL

## 2024-07-30 DIAGNOSIS — R13.10 DYSPHAGIA, UNSPECIFIED TYPE: ICD-10-CM

## 2024-07-30 PROCEDURE — 76536 US EXAM OF HEAD AND NECK: CPT | Mod: GC | Performed by: RADIOLOGY

## 2024-08-05 ENCOUNTER — MYC MEDICAL ADVICE (OUTPATIENT)
Dept: INTERNAL MEDICINE | Facility: CLINIC | Age: 34
End: 2024-08-05
Payer: COMMERCIAL

## 2024-10-04 NOTE — PROGRESS NOTES
"                     [facility]       SUBJECTIVE:  Dariana Castellon is a 34 year old female with a PMHx of dysphagia who comes in for follow up     Last seen 7/2024 in clinic for globus. At the time she had a normal TSH, neg TPO Ab. She also had a thyroid US with a small TR3 nodule, 0.7cm in largest diameter, slightly enlarged thyroid with R lobe 5.4 x 1.8 x 1.6 cm, and L lobe 5.6 x 2.0 x 1.7 cm. She was also started on 2 week trial of pantoprazole to see if improved sxs    Today she says she still feels as if she has this nodule that doesn't move in her R neck. She says it doesn't bother her, doesn't hurt, doesn't impact her swallowing. She says it has improved about 50% since our last appt just based on sxs, but feels it is the same size and location. She eats and drinks okay. No bad breath. No night sweats or weight loss. No recurrent infections. No drooling. She feels this is deep and not superficial. She was eating cloves and wonders if this is related. She says she only took one day of the pantoprazole after our last visit. She has a mole in close proximity to her R throat that she says she has had since birth, slightly enlarged during pregnancy but otherwise no changes in size, color, homogeniety               ROS:   As above in subjective    OBJECTIVE:    /68 (BP Location: Right arm, Patient Position: Sitting, Cuff Size: Adult Large)   Pulse 77   Ht 1.6 m (5' 2.99\")   Wt 68.9 kg (151 lb 14.4 oz)   SpO2 99%   BMI 26.91 kg/m     Wt Readings from Last 1 Encounters:   10/08/24 68.9 kg (151 lb 14.4 oz)       Physical Exam  GEN: alert, comfortable, NAD  HEENT: EOMI,  anicteric sclera, MMM, slightly enlarged thyroid, no obvious nodules on palpation.  CV: RRR, normal S1 S2, no m/g/r  RESP: lungs clear to auscultation - no rales, rhonchi or wheezes  SKIN:  ~0.4cm raised mole with regular borders and uniform color below R neck  NEURO: Alert and oriented, moving all limbs spontaneously, mentation " intact and speech normal  PSYCH: Appropriate mood and affect to match       ASSESSMENT/PLAN:    Dariana Castellon is a 34 year old female with a PMHx of dysphagia who comes in for follow up    #Globus sensation  No clear red flag symptoms. Still persistent for multiple months. No nodule felt on exam. Pt feels spot is deeper, denying issues with swallowing. Slightly enlarged thyroid on thryoid US with no nodules on R side, neg TSH, neg TPO. Possible ddx can include globus sensation, diverticulum, GERD, stricture  - Will trial four weeks of omeprazole  - Esophagram  - If no improvement in sxs, plan for EGD      #HCM  - Not interested in flu shot   - States she already recieved COVID shot outside  - States she got a Pap smear in [place] in 2021 and was told she would not need one for another 5 years. Discussed if she would be able to find her results to share with our clinic    Pt should return to clinic for f/u with me in 6 weeks     Plan of care discussed with Dr Winston.     Ralph Dwyer, PGY3  Oct 8, 2024    Attending Addendum:  Patient seen and examined with resident in clinic today.  Pertinent portions of history and exam were independently verified by myself.  I agree with the exam and plan as outlined above with the following modifications: none.  Daly Winston MD  Internal Medicine

## 2024-10-08 ENCOUNTER — OFFICE VISIT (OUTPATIENT)
Dept: INTERNAL MEDICINE | Facility: CLINIC | Age: 34
End: 2024-10-08
Payer: COMMERCIAL

## 2024-10-08 VITALS
BODY MASS INDEX: 26.91 KG/M2 | WEIGHT: 151.9 LBS | DIASTOLIC BLOOD PRESSURE: 68 MMHG | OXYGEN SATURATION: 99 % | HEIGHT: 63 IN | HEART RATE: 77 BPM | SYSTOLIC BLOOD PRESSURE: 102 MMHG

## 2024-10-08 DIAGNOSIS — R09.A2 GLOBUS SENSATION: ICD-10-CM

## 2024-10-08 DIAGNOSIS — Z12.4 CERVICAL CANCER SCREENING: Primary | ICD-10-CM

## 2024-10-08 PROCEDURE — 99214 OFFICE O/P EST MOD 30 MIN: CPT | Mod: GC

## 2024-10-08 NOTE — PROGRESS NOTES
Please see note dated same day for medical decision making      Subjective   Dariana is a 34 year old, presenting for the following health issues:  Follow Up (Breanna would like to discuss more about previous lab/US results. )      10/8/2024     8:37 AM   Additional Questions   Roomed by      History of Present Illness       Reason for visit:  Follow up    She eats 2-3 servings of fruits and vegetables daily.She consumes 1 sweetened beverage(s) daily.She exercises with enough effort to increase her heart rate 20 to 29 minutes per day.  She exercises with enough effort to increase her heart rate 3 or less days per week.   She is taking medications regularly.

## 2024-10-08 NOTE — PATIENT INSTRUCTIONS
Please take omeprazole for 4 weeks and follow up in 6 weeks. We will also plan to do an esophagram

## 2024-11-17 PROBLEM — Z30.46 NEXPLANON REMOVAL: Status: ACTIVE | Noted: 2024-11-17

## 2024-11-18 ENCOUNTER — OFFICE VISIT (OUTPATIENT)
Dept: OBGYN | Facility: CLINIC | Age: 34
End: 2024-11-18
Attending: ADVANCED PRACTICE MIDWIFE
Payer: COMMERCIAL

## 2024-11-18 VITALS
BODY MASS INDEX: 26.91 KG/M2 | HEART RATE: 80 BPM | HEIGHT: 63 IN | DIASTOLIC BLOOD PRESSURE: 76 MMHG | SYSTOLIC BLOOD PRESSURE: 119 MMHG

## 2024-11-18 DIAGNOSIS — Z30.46 NEXPLANON REMOVAL: Primary | ICD-10-CM

## 2024-11-18 NOTE — PATIENT INSTRUCTIONS
Thank you for trusting us with your care!   Please be aware, if you are on Mychart, you may see your results prior to your providers review. If labs are abnormal, we will call or message you on Tavernt with a follow up plan.    If you need to contact us for questions about:  Symptoms, Scheduling & Medical Questions; Non-urgent (2-3 day response) devsisters message, Urgent (needing response today) [phone] (if after 3:30pm next day response)   Prescriptions: Please call your Pharmacy   Billing: [facility] [phone] or [facility]:[phone]

## 2024-11-18 NOTE — LETTER
11/18/2024       RE: Dariana Castellon  [address] [facility] [id number]     Dear Colleague,    Thank you for referring your patient, Dariana Castellon, to the [facility] [place] [facility]. Please see a copy of my visit note below.    Nexplanon Removal:     Is a pregnancy test required: No.  Was a consent obtained?  Yes    Dariana Castellon is here for removal of etonogestrel implant Nexplanon/Implanon  Dariana had a Nexplanon inserted 2/2024.   Dariana has not had much bleeding, she is happy with this side effect  Dariana has noticed 8/10 back pain in the middle of her back, some low back pain. She has three small children under 6. She has just noticed the back pain since she had the Nexplanon inserted and is concerned that it is secondary to the nexplanon  She would like to have the Nexplanon removed even though this is not a common side effect of the Nexplanon  PAP 2021-normal, in [place] Due for preventive health exam    Preoperative Diagnosis: etonogestrel implant  Postoperative Diagnosis: etonogestrel implant removed    Technique: On the left arm  Skin prep Betadine  Anesthesia 1% lidocaine  Procedure: Small incision (<5mm) was made at distal end of palpable implant, curved hemostat or mosquito forceps was used to isolate the implant and bring it to the incision, the fibrous capsule containing the implant  was incised and the Implant was removed intact.    EBL: minimal  Complications:  No  Tolerance:  Pt tolerated procedure well and was in stable condition.   Dressing:    A pressure bandage was placed for the next 12-24 hours.    Contraception was discussed and patient chose the following method condoms for the next month. If her back pain persists she may return for another Nexplanon.   Due for a preventive health exam      Follow up: Pt was instructed to call if bleeding, severe pain or foul smell.     Pili Vasquez  PREETI      Again, thank you for allowing me to participate in the care of your patient.      Sincerely,    Pili Vasquez CNM

## 2024-11-18 NOTE — PROGRESS NOTES
Nexplanon Removal:     Is a pregnancy test required: No.  Was a consent obtained?  Yes    Dariana Castellon is here for removal of etonogestrel implant Nexplanon/Implanon  Dariana had a Nexplanon inserted 2/2024.   Dariana has not had much bleeding, she is happy with this side effect  Dariana has noticed 8/10 back pain in the middle of her back, some low back pain. She has three small children under 6. She has just noticed the back pain since she had the Nexplanon inserted and is concerned that it is secondary to the nexplanon  She would like to have the Nexplanon removed even though this is not a common side effect of the Nexplanon  PAP 2021-normal, in [place] Due for preventive health exam    Preoperative Diagnosis: etonogestrel implant  Postoperative Diagnosis: etonogestrel implant removed    Technique: On the left arm  Skin prep Betadine  Anesthesia 1% lidocaine  Procedure: Small incision (<5mm) was made at distal end of palpable implant, curved hemostat or mosquito forceps was used to isolate the implant and bring it to the incision, the fibrous capsule containing the implant  was incised and the Implant was removed intact.    EBL: minimal  Complications:  No  Tolerance:  Pt tolerated procedure well and was in stable condition.   Dressing:    A pressure bandage was placed for the next 12-24 hours.    Contraception was discussed and patient chose the following method condoms for the next month. If her back pain persists she may return for another Nexplanon.   Due for a preventive health exam      Follow up: Pt was instructed to call if bleeding, severe pain or foul smell.     Pili Vasquez CNM

## 2025-03-16 ENCOUNTER — HEALTH MAINTENANCE LETTER (OUTPATIENT)
Age: 35
End: 2025-03-16

## (undated) RX ORDER — LIDOCAINE HYDROCHLORIDE AND EPINEPHRINE 10; 10 MG/ML; UG/ML
INJECTION, SOLUTION INFILTRATION; PERINEURAL
Status: DISPENSED
Start: 2024-11-18